# Patient Record
Sex: FEMALE | Race: WHITE | NOT HISPANIC OR LATINO | Employment: OTHER | URBAN - NONMETROPOLITAN AREA
[De-identification: names, ages, dates, MRNs, and addresses within clinical notes are randomized per-mention and may not be internally consistent; named-entity substitution may affect disease eponyms.]

---

## 2019-03-11 ENCOUNTER — HISTORICAL (OUTPATIENT)
Dept: ADMINISTRATIVE | Facility: HOSPITAL | Age: 84
End: 2019-03-11

## 2019-05-08 ENCOUNTER — HISTORICAL (OUTPATIENT)
Dept: ADMINISTRATIVE | Facility: HOSPITAL | Age: 84
End: 2019-05-08

## 2022-09-21 ENCOUNTER — HISTORICAL (OUTPATIENT)
Dept: ADMINISTRATIVE | Facility: HOSPITAL | Age: 87
End: 2022-09-21

## 2023-07-17 ENCOUNTER — HOSPITAL ENCOUNTER (OUTPATIENT)
Dept: RADIOLOGY | Facility: HOSPITAL | Age: 88
Discharge: HOME | End: 2023-07-17
Attending: NURSE PRACTITIONER
Payer: COMMERCIAL

## 2023-07-17 DIAGNOSIS — K59.09 CHRONIC CONSTIPATION: ICD-10-CM

## 2023-07-17 PROCEDURE — 74019 RADEX ABDOMEN 2 VIEWS: CPT | Mod: TC

## 2023-11-12 ENCOUNTER — HOSPITAL ENCOUNTER (INPATIENT)
Facility: HOSPITAL | Age: 88
LOS: 5 days | Discharge: NURSING, REHAB OR LONG-TERM CARE | DRG: 124 | End: 2023-11-17
Attending: EMERGENCY MEDICINE | Admitting: SURGERY
Payer: MEDICARE

## 2023-11-12 DIAGNOSIS — S06.6XAA TRAUMATIC SUBARACHNOID HEMORRHAGE WITH UNKNOWN LOSS OF CONSCIOUSNESS STATUS, INITIAL ENCOUNTER: ICD-10-CM

## 2023-11-12 DIAGNOSIS — S01.81XA FACIAL LACERATION, INITIAL ENCOUNTER: ICD-10-CM

## 2023-11-12 DIAGNOSIS — S06.5X0A TRAUMATIC SUBDURAL HEMATOMA WITHOUT LOSS OF CONSCIOUSNESS, INITIAL ENCOUNTER: ICD-10-CM

## 2023-11-12 DIAGNOSIS — S02.401A CLOSED FRACTURE OF MAXILLARY SINUS, INITIAL ENCOUNTER: ICD-10-CM

## 2023-11-12 DIAGNOSIS — R07.9 CHEST PAIN: ICD-10-CM

## 2023-11-12 DIAGNOSIS — S52.612A TRAUMATIC CLOSED FRACTURE OF ULNAR STYLOID WITH MINIMAL DISPLACEMENT, LEFT, INITIAL ENCOUNTER: ICD-10-CM

## 2023-11-12 DIAGNOSIS — R55 NEAR SYNCOPE: ICD-10-CM

## 2023-11-12 DIAGNOSIS — S02.32XA: ICD-10-CM

## 2023-11-12 DIAGNOSIS — S06.5XAA SUBDURAL HEMATOMA: Primary | ICD-10-CM

## 2023-11-12 DIAGNOSIS — S06.5X9A TRAUMATIC SUBDURAL HEMATOMA WITH LOSS OF CONSCIOUSNESS, INITIAL ENCOUNTER: ICD-10-CM

## 2023-11-12 PROBLEM — S02.40DD CLOSED FRACTURE OF LEFT SIDE OF MAXILLA WITH ROUTINE HEALING: Status: ACTIVE | Noted: 2023-11-12

## 2023-11-12 LAB
ABORH RETYPE: NORMAL
ALBUMIN SERPL-MCNC: 3.3 G/DL (ref 3.4–4.8)
ALBUMIN/GLOB SERPL: 1 RATIO (ref 1.1–2)
ALP SERPL-CCNC: 74 UNIT/L (ref 40–150)
ALT SERPL-CCNC: 15 UNIT/L (ref 0–55)
ANION GAP SERPL CALC-SCNC: 8 MEQ/L
APTT PPP: 25.6 SECONDS (ref 23.2–33.7)
AST SERPL-CCNC: 19 UNIT/L (ref 5–34)
BASOPHILS # BLD AUTO: 0.03 X10(3)/MCL
BASOPHILS NFR BLD AUTO: 0.2 %
BILIRUB SERPL-MCNC: 1.1 MG/DL
BUN SERPL-MCNC: 15.6 MG/DL (ref 9.8–20.1)
BUN SERPL-MCNC: 16 MG/DL (ref 9.8–20.1)
CALCIUM SERPL-MCNC: 9.2 MG/DL (ref 8.4–10.2)
CALCIUM SERPL-MCNC: 9.4 MG/DL (ref 8.4–10.2)
CHLORIDE SERPL-SCNC: 102 MMOL/L (ref 98–111)
CHLORIDE SERPL-SCNC: 104 MMOL/L (ref 98–111)
CO2 SERPL-SCNC: 26 MMOL/L (ref 23–31)
CO2 SERPL-SCNC: 29 MMOL/L (ref 23–31)
CREAT SERPL-MCNC: 0.79 MG/DL (ref 0.55–1.02)
CREAT SERPL-MCNC: 0.89 MG/DL (ref 0.55–1.02)
CREAT/UREA NIT SERPL: 18
EOSINOPHIL # BLD AUTO: 0 X10(3)/MCL (ref 0–0.9)
EOSINOPHIL NFR BLD AUTO: 0 %
ERYTHROCYTE [DISTWIDTH] IN BLOOD BY AUTOMATED COUNT: 14 % (ref 11.5–17)
ERYTHROCYTE [DISTWIDTH] IN BLOOD BY AUTOMATED COUNT: 14.2 % (ref 11.5–17)
GFR SERPLBLD CREATININE-BSD FMLA CKD-EPI: 60 MLS/MIN/1.73/M2
GFR SERPLBLD CREATININE-BSD FMLA CKD-EPI: >60 MLS/MIN/1.73/M2
GLOBULIN SER-MCNC: 3.2 GM/DL (ref 2.4–3.5)
GLUCOSE SERPL-MCNC: 101 MG/DL (ref 75–121)
GLUCOSE SERPL-MCNC: 128 MG/DL (ref 75–121)
GROUP & RH: NORMAL
HCT VFR BLD AUTO: 36.3 % (ref 37–47)
HCT VFR BLD AUTO: 36.8 % (ref 37–47)
HGB BLD-MCNC: 12.3 G/DL (ref 12–16)
HGB BLD-MCNC: 12.4 G/DL (ref 12–16)
IMM GRANULOCYTES # BLD AUTO: 0.04 X10(3)/MCL (ref 0–0.04)
IMM GRANULOCYTES NFR BLD AUTO: 0.3 %
INDIRECT COOMBS GEL: NORMAL
INR PPP: 1
LYMPHOCYTES # BLD AUTO: 1.48 X10(3)/MCL (ref 0.6–4.6)
LYMPHOCYTES NFR BLD AUTO: 10.5 %
MCH RBC QN AUTO: 31.7 PG (ref 27–31)
MCH RBC QN AUTO: 31.8 PG (ref 27–31)
MCHC RBC AUTO-ENTMCNC: 33.4 G/DL (ref 33–36)
MCHC RBC AUTO-ENTMCNC: 34.2 G/DL (ref 33–36)
MCV RBC AUTO: 92.8 FL (ref 80–94)
MCV RBC AUTO: 95.1 FL (ref 80–94)
MONOCYTES # BLD AUTO: 0.89 X10(3)/MCL (ref 0.1–1.3)
MONOCYTES NFR BLD AUTO: 6.3 %
NEUTROPHILS # BLD AUTO: 11.59 X10(3)/MCL (ref 2.1–9.2)
NEUTROPHILS NFR BLD AUTO: 82.7 %
NRBC BLD AUTO-RTO: 0 %
NRBC BLD AUTO-RTO: 0 %
PLATELET # BLD AUTO: 224 X10(3)/MCL (ref 130–400)
PLATELET # BLD AUTO: 246 X10(3)/MCL (ref 130–400)
PMV BLD AUTO: 11.3 FL (ref 7.4–10.4)
PMV BLD AUTO: 11.4 FL (ref 7.4–10.4)
POTASSIUM SERPL-SCNC: 3.8 MMOL/L (ref 3.5–5.1)
POTASSIUM SERPL-SCNC: 3.9 MMOL/L (ref 3.5–5.1)
PROT SERPL-MCNC: 6.5 GM/DL (ref 5.8–7.6)
PROTHROMBIN TIME: 13.4 SECONDS (ref 12.5–14.5)
RBC # BLD AUTO: 3.87 X10(6)/MCL (ref 4.2–5.4)
RBC # BLD AUTO: 3.91 X10(6)/MCL (ref 4.2–5.4)
SODIUM SERPL-SCNC: 137 MMOL/L (ref 132–146)
SODIUM SERPL-SCNC: 139 MMOL/L (ref 132–146)
SPECIMEN OUTDATE: NORMAL
WBC # SPEC AUTO: 12.55 X10(3)/MCL (ref 4.5–11.5)
WBC # SPEC AUTO: 14.03 X10(3)/MCL (ref 4.5–11.5)

## 2023-11-12 PROCEDURE — 99285 EMERGENCY DEPT VISIT HI MDM: CPT | Mod: 25

## 2023-11-12 PROCEDURE — 85730 THROMBOPLASTIN TIME PARTIAL: CPT | Performed by: EMERGENCY MEDICINE

## 2023-11-12 PROCEDURE — 11000001 HC ACUTE MED/SURG PRIVATE ROOM

## 2023-11-12 PROCEDURE — 80053 COMPREHEN METABOLIC PANEL: CPT | Performed by: EMERGENCY MEDICINE

## 2023-11-12 PROCEDURE — 25000003 PHARM REV CODE 250: Performed by: STUDENT IN AN ORGANIZED HEALTH CARE EDUCATION/TRAINING PROGRAM

## 2023-11-12 PROCEDURE — 51798 US URINE CAPACITY MEASURE: CPT

## 2023-11-12 PROCEDURE — 85025 COMPLETE CBC W/AUTO DIFF WBC: CPT | Performed by: EMERGENCY MEDICINE

## 2023-11-12 PROCEDURE — 99223 1ST HOSP IP/OBS HIGH 75: CPT | Mod: ,,, | Performed by: NEUROLOGICAL SURGERY

## 2023-11-12 PROCEDURE — 99223 PR INITIAL HOSPITAL CARE,LEVL III: ICD-10-PCS | Mod: ,,, | Performed by: NEUROLOGICAL SURGERY

## 2023-11-12 PROCEDURE — 99223 1ST HOSP IP/OBS HIGH 75: CPT | Mod: ,,, | Performed by: SURGERY

## 2023-11-12 PROCEDURE — 85027 COMPLETE CBC AUTOMATED: CPT | Performed by: STUDENT IN AN ORGANIZED HEALTH CARE EDUCATION/TRAINING PROGRAM

## 2023-11-12 PROCEDURE — 99223 PR INITIAL HOSPITAL CARE,LEVL III: ICD-10-PCS | Mod: ,,, | Performed by: SURGERY

## 2023-11-12 PROCEDURE — 86901 BLOOD TYPING SEROLOGIC RH(D): CPT | Performed by: EMERGENCY MEDICINE

## 2023-11-12 PROCEDURE — 85610 PROTHROMBIN TIME: CPT | Performed by: EMERGENCY MEDICINE

## 2023-11-12 RX ORDER — LEVETIRACETAM 500 MG/1
500 TABLET ORAL 2 TIMES DAILY
Status: DISCONTINUED | OUTPATIENT
Start: 2023-11-12 | End: 2023-11-14

## 2023-11-12 RX ORDER — ONDANSETRON 2 MG/ML
4 INJECTION INTRAMUSCULAR; INTRAVENOUS EVERY 6 HOURS PRN
Status: DISCONTINUED | OUTPATIENT
Start: 2023-11-12 | End: 2023-11-17 | Stop reason: HOSPADM

## 2023-11-12 RX ORDER — SODIUM CHLORIDE 0.9 % (FLUSH) 0.9 %
10 SYRINGE (ML) INJECTION
Status: DISCONTINUED | OUTPATIENT
Start: 2023-11-12 | End: 2023-11-17 | Stop reason: HOSPADM

## 2023-11-12 RX ORDER — OXYCODONE HYDROCHLORIDE 5 MG/1
5 TABLET ORAL EVERY 6 HOURS PRN
Status: DISCONTINUED | OUTPATIENT
Start: 2023-11-12 | End: 2023-11-17 | Stop reason: HOSPADM

## 2023-11-12 RX ORDER — MORPHINE SULFATE 4 MG/ML
2 INJECTION, SOLUTION INTRAMUSCULAR; INTRAVENOUS EVERY 4 HOURS PRN
Status: DISCONTINUED | OUTPATIENT
Start: 2023-11-12 | End: 2023-11-14

## 2023-11-12 RX ORDER — SODIUM CHLORIDE 9 MG/ML
INJECTION, SOLUTION INTRAVENOUS CONTINUOUS
Status: DISCONTINUED | OUTPATIENT
Start: 2023-11-12 | End: 2023-11-13

## 2023-11-12 RX ADMIN — LEVETIRACETAM 500 MG: 500 TABLET, FILM COATED ORAL at 08:11

## 2023-11-12 RX ADMIN — SODIUM CHLORIDE: 9 INJECTION, SOLUTION INTRAVENOUS at 08:11

## 2023-11-12 RX ADMIN — LEVETIRACETAM 500 MG: 500 TABLET, FILM COATED ORAL at 09:11

## 2023-11-12 RX ADMIN — SODIUM CHLORIDE: 9 INJECTION, SOLUTION INTRAVENOUS at 01:11

## 2023-11-12 RX ADMIN — SODIUM CHLORIDE: 9 INJECTION, SOLUTION INTRAVENOUS at 03:11

## 2023-11-12 NOTE — PLAN OF CARE
Problem: Adult Inpatient Plan of Care  Goal: Patient-Specific Goal (Individualized)  Outcome: Ongoing, Progressing     Problem: Impaired Wound Healing  Goal: Optimal Wound Healing  Outcome: Ongoing, Progressing     Problem: Fall Injury Risk  Goal: Absence of Fall and Fall-Related Injury  Outcome: Ongoing, Progressing     Problem: Skin Injury Risk Increased  Goal: Skin Health and Integrity  Outcome: Ongoing, Progressing

## 2023-11-12 NOTE — CONSULTS
[facility]  Hospital Consultation      Reason for Consultation: s/p 2 falls yesterday on 11/11/2023 with intermittent confusion.  A CT head without contrast demonstrates a focal left acute subdural hematoma in the posterior parietal region measuring 6 mm with no significant midline shift    Consulted by: Dr. Ban Chase, REBECA Lucio    Date of Consultation: 11/12/2023  Date of Admission: 11/12/2023     SUBJECTIVE:     Chief Complaint: s/p 2 falls yesterday on 11/11/2023 with intermittent confusion.    History of Present Illness:   Ms. Lejeune is a 89 y.o. functionally independent female who has no known medical conditions and is not on any blood thinner medications.  Yesterday on 11/11/2023, the patient had 2 falls with altered mental status.  She was initially evaluated in the ER at [facility].  A CT head without contrast demonstrated a focal left acute subdural hematoma in the posterior parietal region measuring 6 mm with no significant midline shift.  A CT face without contrast demonstrated multiple left-sided facial fractures.  Ms. Lejeune was transferred to the ER at [facility] for higher level of care.  She had a GCS 13 upon arrival.    I evaluated the patient in the ICU this morning.  She not have any headaches, visual changes, nausea, vomiting, seizures, weakness, or numbness.  Ms. Lejeune has left periorbital ecchymosis and edema as well as mild neck pain.  At baseline, the patient lives by herself and ambulates with a cane on an as-needed basis.    Admission lab work demonstrates platelets, PT, INR, and PTT values to be within acceptable ranges.  Keppra was initiated in the ER for seizure prophylaxis.      Patient Active Problem List    Diagnosis Date Noted    Subdural hematoma 11/12/2023    Closed fracture of left orbital floor 11/12/2023    Closed fracture of left side of maxilla with routine healing 11/12/2023     History  reviewed. No pertinent past medical history.  History reviewed. No pertinent surgical history.    Current Facility-Administered Medications:     0.9%  NaCl infusion, , Intravenous, Continuous, Samuel Frank MD, Last Rate: 100 mL/hr at 11/12/23 0700, Rate Verify at 11/12/23 0700    levETIRAcetam tablet 500 mg, 500 mg, Oral, BID, Samuel Frank MD    morphine injection 2 mg, 2 mg, Intravenous, Q4H PRN, Samuel Frank MD    ondansetron injection 4 mg, 4 mg, Intravenous, Q6H PRN, Samuel Frank MD    oxyCODONE immediate release tablet 5 mg, 5 mg, Oral, Q6H PRN, Samuel Frank MD    sodium chloride 0.9% flush 10 mL, 10 mL, Intravenous, PRN, Samuel Frank MD    Review of patient's allergies indicates:   Allergen Reactions    Codeine        Social History     Tobacco Use    Smoking status: Not on file    Smokeless tobacco: Not on file   Substance Use Topics    Alcohol use: Not on file       History reviewed. No pertinent family history.    ROS:  Constitutional:  Negative for chills and fever.   HENT:  Negative for congestion and sore throat.    Eyes:  Negative for blurred vision and double vision.   Respiratory:  Negative for cough and shortness of breath.    Cardiovascular:  Negative for chest pain and palpitations.   Gastrointestinal:  Negative for constipation, diarrhea, nausea and vomiting.   Musculoskeletal:  Positive for neck pain, Negative for back pain.  Neurological:  Negative for sensory change, focal weakness and headaches.   Endo/Heme/Allergies:  Does not bruise/bleed easily.   Psychiatric/Behavioral:  Negative for depression. The patient is not nervous/anxious.        OBJECTIVE:     Vital Signs (Most Recent)  Temp: 98.8 °F (37.1 °C) (11/12/23 0800)  Pulse: 72 (11/12/23 0900)  Resp: 15 (11/12/23 0900)  BP: (!) 147/69 (11/12/23 0900)  SpO2: 96 % (11/12/23 0900)  Body mass index is 21.26 kg/m².      Constitutional:   Frail appearing,  "cooperative    HEENT:   Significant L periorbital edema and ecchymosis    Body habitus:  Thin    Mental Status:   GCS- 15  E-4, V-5, M-6    Opens eyes spontaneously  Oriented x 3  Normal speech  Follows commands in all extremities    Cranial nerves:  CN II: PERRL, 4 to 3 mm, brisk bilaterally  CN III, IV, and VI: extraocular movements normal  CN V: normal facial sensation and masseter function  CN VII: facial strength normal and symmetrical  CN VIII: hearing normal bilaterally  CN IX and X: swallowing and phonation normal  CN XI: shoulder shrug intact bilaterally  CN XII: tongue protrusion midline    Motor:  Muscle bulk: decreased in all extremities  Tone: normal in all extremities  No pronator drift    Upper extremities:  Deltoid: right 5/5; left 5/5  Biceps: right 5/5; left 5/5  Triceps: right 5/5; left 5/5  : right 5/5; left 5/5  Interosseous muscles: right 5/5; left 5/5    Lower extremities:  Hip flexors: right 5/5; left 5/5  Knee extensors: right 5/5; left 5/5  Knee flexors: right 5/5; left 5/5  Foot dorsiflexors: right 5/5; left 5/5  Foot plantar flexors: right 5/5; left 5/5    Sensation:  Normal to light touch x 4 extremities    Reflexes:  Biceps: right 1+/4; left 1+/4  Brachioradialis: right 1+/4; left 1+/4  Triceps: right 1+/4; left 1+/4  Knee: right 1+/4; left 1+/4  Ankle: right 1+/4; left 1+/4    Novaks sign: right negative; left negative  Babinski: right upgoing; left upgoing  Clonus: right negative; left negative    Coordination:  Finger to nose: right normal; left normal    Musculoskeletal:  Cervical: No pain with palpation, Full ROM  Upper back: No pain with palpation  Lower back: No pain with palpation      Data Review:    Laboratory Review:  Blood Gases:  No results found for: "PHART", "RQM1EQV", "PO2ART", "FEJ5TZK", "U8SQTIFF", "BEART"    CBC without Differential:  Lab Results   Component Value Date    WBC 12.55 (H) 11/12/2023    HGB 12.3 11/12/2023    HCT 36.8 (L) 11/12/2023     " "11/12/2023    MCV 95.1 (H) 11/12/2023     Differential:   No results found for: "NEUTROABS", "LYMPHSABS", "BASOSABS", "MONOSABS"    Basic Metabolic Panel:  BMP  Lab Results   Component Value Date     11/12/2023    K 3.9 11/12/2023    CO2 29 11/12/2023    BUN 16.0 11/12/2023    CREATININE 0.89 11/12/2023    CALCIUM 9.2 11/12/2023    EGFRNORACEVR 60 11/12/2023     Coagulation Studies:  Lab Results   Component Value Date    INR 1.0 11/12/2023    PROTIME 13.4 11/12/2023     Lab Results   Component Value Date    PTT 25.6 11/12/2023     Urinalysis:  No results found for: "SPECGRAV", "PHURINE", "LEUKOCYTESUR", "URINEPROTEIN", "PROTEINUR", "BILIRUBINUR", "UROBILINOGEN"       Radiology:  I have personally reviewed and evaluated the following reports as well as radiographic studies:    CT head without contrast, 11/12/2023- when compared to a CT head without contrast on 11/11/2023, there have been no significant changes.  There is a stable appearing focal  left acute subdural hematoma in the posterior parietal region measuring 6 mm with no significant midline shift.     CT face without contrast, 11/11/2023-  fractures involving the posterior floor of the left orbit, left zygomatic arch, and left maxillary sinus.    CT cervical spine without contrast, 11/11/2023- minimal degenerative retrolisthesis of C4 on C5 with multilevel degenerative disc disease.  There are no fractures.        ASSESSMENT/PLAN:     Ms. Lejeune is a 89 y.o. functionally independent female who has no known medical conditions and is not on any blood thinner medications.  The patient is PID #1 s/p 2 falls with altered mental status.  She currently has a GCS 15 with a normal motor and sensory neurological exam.    A CT head without contrast completed on 11/12/2023 was reviewed.  When compared to a CT head without contrast on 11/11/2023, there have been no significant changes.  There is a stable appearing focal left acute subdural hematoma in the " posterior parietal region measuring 6 mm with no significant midline shift.       1.  The patient has been admitted to the ICU on the trauma surgery service.  Neuro checks are being reduced to Q2 hr from Q1 hr.      2.  There are no plans for acute neurosurgical intervention at this time, as the patient has a GCS 15 with stable CT head findings.  Furthermore, Ms. Lejeune would not be anticipated to do well with any type of major neurosurgical procedure at her advanced age.    3.  Keppra was administered in the ER.  This will be continued for seizure prophylaxis with a dose of 500 mg PO BID for a duration of 7 days post bleed.     4.  Systolic blood pressure goal is 100-150.  A Cleviprex or Cardene gtt can be initiated as needed for hypertension.     5.  The patient is encouraged to mobilize with physical therapy and occupational therapy.      6.  Plastic surgeon Dr. Alexander has been consulted to manage her multiple left sided facial fractures.      7.  As long as Ms. Lejeune remains neurologically stable, she will be able to be transferred to a floor bed from the ICU tomorrow with Q4 hr neuro checks on Monday, 11/13/2023    8.  In addition, the patient will be able to be started on subcutaneous Lovenox for DVT prophylaxis tomorrow.      9.  Case management/ social work has been consulted for discharge planning/ SNF placement.     10.  Ms. Lejeune will continue to be followed by Neurosurgery as an inpatient on an as-needed basis for any concerning changes in condition or symptoms.  Please do not hesitate to contact Neurosurgery for any additional questions.      Thank you for referring this very pleasant patient to the neurosurgery service.         Pooja Mendoza MD  Neurosurgery

## 2023-11-12 NOTE — ED NOTES
BIB EMS from OSH for mechanical fall out of bed. Bruising and swelling noted to L side of pt's face. Head wrapped PTA with dried blood noted underneath. GCS 14 - eyes

## 2023-11-12 NOTE — ED PROVIDER NOTES
Encounter Date: 11/11/2023       History     Chief Complaint   Patient presents with    Fall     Pt arrives via [facility], EMS transfer from [facility] , for neuro services . Pt  responds to voice.      89-year-old female presents to the emergency department as transfer from [facility] for neurosurgical/facial trauma evaluation after unwitnessed fall at home sometime between the hours of 11:00 a.m. and 1:00 p.m. yesterday.      Workup at the previous facility notable for:   1.  Extra-axial hemorrhage along the left posterior parietal lobe region measuring at least 2.4 x 0.6 x 2.6 cm.  No midline shift is seen  2. Comminuted fracture of the left anterior, posterior and medial maxillary sinus wall.  Three.  Fracture of the medial maxillary sinus wall into the nasal cavity   4.  Fracture of the posterior floor of the orbit.  No entrapment of the muscles.      Chest x-ray with displaced rib fracture, no focal infiltrate or effusion.    CT of the cervical spine with no acute fracture of the cervical spine.  Moderate to severe cervical spondylosis described.    Left facial wound closed with Dermabond per documentation    The history is provided by the EMS personnel, medical records and a relative.     Review of patient's allergies indicates:   Allergen Reactions    Codeine      History reviewed. No pertinent past medical history.  History reviewed. No pertinent surgical history.  History reviewed. No pertinent family history.     Review of Systems   Unable to perform ROS: Mental status change       Physical Exam     Initial Vitals [11/12/23 0027]   BP Pulse Resp Temp SpO2   (!) 141/70 89 16 99.3 °F (37.4 °C) 96 %      MAP       --         Physical Exam    Nursing note and vitals reviewed.  Constitutional: She appears well-developed and well-nourished. No distress.   HENT:   Left facial/forehead contusions, superficial abrasions and lacerations, Dermabond in place   Eyes: EOM are normal. Pupils  are equal, round, and reactive to light. Right eye exhibits no discharge. Left eye exhibits no discharge.   Neck: Neck supple.   No midline or paraspinal tenderness to palpation, no step-off deformity, full, active range of motion without pain or hesitation   Normal range of motion.  Cardiovascular:  Normal rate, regular rhythm and intact distal pulses.           Pulmonary/Chest: No respiratory distress. She exhibits no tenderness.   Abdominal: Abdomen is soft. She exhibits no distension. There is no abdominal tenderness.   Musculoskeletal:      Cervical back: Normal range of motion and neck supple.      Comments: No thoracolumbar tenderness elevation, no step-off deformity, no gross long bone deformity of any extremity     Neurological:   Somnolent, arouses to voice, follows commands.  Moves all extremities symmetrically, global weakness appreciated   Skin: Skin is warm and dry.         ED Course   Critical Care    Date/Time: 11/12/2023 12:27 AM    Performed by: Ban Chase MD  Authorized by: Ban Chase MD  Direct patient critical care time: 19 minutes  Additional history critical care time: 6 minutes  Ordering / reviewing critical care time: 6 minutes  Documentation critical care time: 4 minutes  Consulting other physicians critical care time: 8 minutes  Consult with family critical care time: 5 minutes  Total critical care time (exclusive of procedural time) : 48 minutes  Critical care time was exclusive of separately billable procedures and treating other patients.  Critical care was necessary to treat or prevent imminent or life-threatening deterioration of the following conditions: CNS failure or compromise and trauma.  Critical care was time spent personally by me on the following activities: development of treatment plan with patient or surrogate, discussions with consultants, interpretation of cardiac output measurements, evaluation of patient's response to treatment, examination of patient,  obtaining history from patient or surrogate, ordering and performing treatments and interventions, ordering and review of laboratory studies, ordering and review of radiographic studies, pulse oximetry, re-evaluation of patient's condition and review of old charts.        Labs Reviewed   COMPREHENSIVE METABOLIC PANEL - Abnormal; Notable for the following components:       Result Value    Glucose Level 128 (*)     Albumin Level 3.3 (*)     Albumin/Globulin Ratio 1.0 (*)     All other components within normal limits   CBC WITH DIFFERENTIAL - Abnormal; Notable for the following components:    WBC 14.03 (*)     RBC 3.91 (*)     Hct 36.3 (*)     MCH 31.7 (*)     MPV 11.4 (*)     Neut # 11.59 (*)     All other components within normal limits   PROTIME-INR - Normal   APTT - Normal   CBC W/ AUTO DIFFERENTIAL    Narrative:     The following orders were created for panel order CBC auto differential.  Procedure                               Abnormality         Status                     ---------                               -----------         ------                     CBC with Differential[[id number]]       Abnormal            Final result                 Please view results for these tests on the individual orders.   CBC WITHOUT DIFFERENTIAL   BASIC METABOLIC PANEL   TYPE & SCREEN   ABORH RETYPE          Imaging Results              CT Head Without Contrast (Final result)  Result time 11/12/23 08:41:55      Final result by Chino Santana MD (11/12/23 08:41:55)                   Impression:      Findings seen consistent with subdural hemorrhage in the left parietal and occipital region as well as subarachnoid hemorrhage in the left parietal region at the centrum semiovale and small amount of blood the along the  inferior aspect of the left sylvian fissure.  Findings are unchanged since the prior examination.      Electronically signed by: Chino Santana  Date:    11/12/2023  Time:    08:41                Narrative:    EXAMINATION:  CT HEAD WITHOUT CONTRAST    CLINICAL HISTORY:  Subdural hemorrhage;    TECHNIQUE:  Multiple axial images were obtained from the base of the brain to the vertex without contrast administration.  Sagittal and coronal reconstructions were performed. .Automatic exposure control  (AEC) is utilized to reduce patient radiation exposure.    COMPARISON:  11/11/2023 from an outside institution    FINDINGS:  There is some cerebral atrophy seen consistent with patient's age. There is some compensatory ventricular dilatation and periventricular white matter change consistent with patient's age.  There is an area of calcification seen in the left parietal region near the cerebral convexity.    There is a subdural hematoma seen in the left posterior parietal region extending into the occipital region.  Maximum dimension of the subdural hematoma is 7.2 mm.  This was seen on the prior examination as well and appears unchanged.  There is some subarachnoid hemorrhage seen in the left parietal region near the centrum semiovale.  This appears similar to the prior examination. There is small amount of blood seen in along the inferior aspect the sylvian fissure on the left side.  This appears unchanged.  No new areas of hemorrhage are seen.  The calvarium is intact.    There is evidence of pansinusitis                                       Medications   sodium chloride 0.9% flush 10 mL (has no administration in time range)   0.9%  NaCl infusion (has no administration in time range)   oxyCODONE immediate release tablet 5 mg (has no administration in time range)   morphine injection 2 mg (has no administration in time range)   levETIRAcetam tablet 500 mg (has no administration in time range)   ondansetron injection 4 mg (has no administration in time range)     Medical Decision Making  Problems Addressed:  Closed fracture of left orbital floor: acute illness or injury    Amount and/or Complexity of Data  Reviewed  Labs: ordered.  Radiology: ordered.    Risk  Decision regarding hospitalization.        ED assessment:    Ms. Lejeune presented as transfer for higher level of care, Neurosurgery and facial trauma/ENT/plastic surgery evaluation with multiple facial fractures sustained an unwitnessed fall, subdural hemorrhage described on CT of the outside hospital.  Presently somnolent but arouses to voice, follows commands.  Daughter reports declining mentation throughout transport; however, it is now also middle of the night, unclear if this is simply due to timing or reflective of worsening head injury.  Last CT of the brain about 5-6 hours prior to arrival here, will plan for urgent repeat to ensure no expanding hematoma.  Antibiotics administered previous facility, hemo sinus described.  Unasyn ordered.    Differential diagnosis (including but not limited to):   Closed head injury, intracranial hemorrhage, cerebral contusion, concussion, facial fracture, hemo sinus, pr will contusion, acute kidney injury, electrolyte derangements, urinary tract infection    ED management:   CT of the head with no significant interval change.  Case discussed with neurosurgery who will follow in consultation.  Recommended ICU admit for close neuro monitoring.  Facial fractures discussed with plastic surgeon on-call.  He will follow in consultation.  Case discussed with Trauma surgery who will evaluate bedside for admission.    I did have a conversation at the bedside with the patient's daughter regarding code status given her advanced age and traumatic brain injury.  Desires full code advance directive at this time.  Communicated to Trauma Service.    My independent radiology interpretation:   CT head without IV contrast: Left posterior parietal subdural hematoma, no midline shift       Amount and/or Complexity of Data Reviewed  Independent historian: EMS and daughter    Summary of history:  HPI as documented above provided by EMS and  daughter.  Reports unwitnessed fall, happened sometime suspected between 11:00 a.m. and 1:00 p.m. yesterday.  States was more alert, more somnolent as the evening has worn on.  Baseline very talkative, less so today.  External data reviewed: transfer records, prior labs, and prior imaging  Summary of data reviewed:     CT head without IV contrast 7:30 p.m.:  Ventricles are normal limits for size.  Both globes appear symmetric.  The mastoid air cells appear clear.  There is minimal mucoperiosteal thickening of the ethmoid sinuses.  There is partial opacification of the left maxillary, sphenoid, and bilateral ethmoid sinuses.  A coarse calcification is seen at the left periventricular white matter.  The calvarium is intact.  The gray-white matter differentiation is within normal limits.  There is acute extra-axial hemorrhage along the left posterior parietal lobe region measuring at least 2.4 x 0.6 x 2.6 cm.  No midline shift is seen.     CT facial without IV contrast:  TMJ 0.0 appropriately positioned.  Mandible is intact.  Implants are seen in both the right and left side of the mandible and on the right anterior implant there is a lucency seen adjacent to the bone mandibular interface.  This may represent loosening.  The alveolar ridge of the maxilla is intact.  Pterygoid plates are unremarkable.  There is focal angulation of the left zygomatic arch suggesting infection fracture.  Comminuted fracture of the left anterior, posterior and medial maxillary sinus wall is seen.  Nasal septum and nasal bones are intact.  There is fracture of the posterior floor of the orbit.  No entrapment of the muscles is seen.  There is fracture with a medial maxillary sinus wall into the nasal cavity.  Orbits and paranasal sinuses: Globe is intact.  No intraconal or extraconal abnormality.  Complete opacification of the left maxillary sinus with hyperdense material consistent blood.  There is opacification of the left frontal sinuses  as well as a fluid air cells.  Air-fluid levels noted in the sphenoid sinus.  Mastoid air cells are aerated.  Soft tissues:  Soft tissue swelling is over the left orbit and there is air in the subcutaneous tissues of the left temporal region suggesting laceration.    CT cervical spine without IV contrast:  No acute fracture of the cervical spine.  Minimal retrolisthesis at C4-C5 is probably due to degenerative disc disease and facet arthropathy to severe cervical spondylosis.  Fusion of the bilateral C2-C3 and right C3-C4 facet joints and right C5-C6 facet joints.  Severe disc height loss at C4-5 with severe right facet arthropathy.  Disc osteophyte complexes of C3-C7 most prominent at C4-C5 with moderate central canal stenosis.  Severe foraminal stenosis at right C3-4, bilateral C4-5, bilateral C5-6, bilateral C6-7.  Soft tissues unremarkable.    Chest x-ray:  No acute airspace opacities, pleural effusion or pneumothorax.  Heart/mediastinum unremarkable.  Old fracture deformity of the proximal right humerus.  Soft tissues unremarkable.    EKG with normal sinus rhythm Priyanka beats per minute, occasional PAC.  QT prolongation, no STEMI.  WBC 16.2, hemoglobin 12.4, hematocrit 38.0, platelets 235   Sodium 140, potassium 4.1, chloride 104, CO2 26, BUN 17, creatinine 0.83, glucose 130    Risk and benefits of testing: discussed   Labs: ordered and reviewed  Radiology: ordered and independent interpretation performed (see above or ED course)    Discussion of management or test interpretation with external provider(s): discussed with Trauma surgery, Neurosurgery, facial trauma surgery consultant   Summary of discussion:  As below    Risk  Decision regarding hospitalization  Shared decision making     Critical Care  30-74 minutes     Ban LOVE MD personally performed the history, PE, MDM, and procedures as documented above and agree with the scribe's documentation. \             ED Course as of 11/12/23 0257   Sun Nov  12, 2023   0046 This patient has had declining mentation throughout the evening, more somnolent now.  Though may be appropriate for time of day, it has been over 5 hours since the initial CT scan.  Will repeat now to ensure no expanding hematoma. [KS]   0055 Discussed with facial trauma on-call, Dr. Alexander.  Consultation placed  Discussed with Neurosurgery on-call, Dr. Mendoza. Consultation placed.  [KS]   0149 Presently having trouble with PACS communication to [facility], unable to get images to radiologist.  [KS]   0204 Discussed with Trauma resident. [KS]      ED Course User Index  [KS] Ban Chase MD                      Clinical Impression:   Final diagnoses:  [S02.32XA] Closed fracture of left orbital floor  [S06.5X9A] Traumatic subdural hematoma with loss of consciousness, initial encounter  [S06.6XAA] Traumatic subarachnoid hemorrhage with unknown loss of consciousness status, initial encounter  [S02.401A] Closed fracture of maxillary sinus, initial encounter  [S01.81XA] Facial laceration, initial encounter        ED Disposition Condition    Admit Stable                Ban Chase MD  11/12/23 8076

## 2023-11-12 NOTE — CONSULTS
[facility] - [facility] [address] [facility]  Plastic Surgery  Consult Note    Patient Name: Georgeanna P Lejeune  MRN: [id number]  Code Status: Full Code  Admission Date: 11/12/2023  Hospital Length of Stay: 0 days  Attending Physician: Rajiv Frederick MD  Primary Care Provider: Suzette Kuo NP    Consults  Subjective:     Chief Complaint/Reason for Admission: fall    History of Present Illness: 89-year-old female with no apparent past medical history reportedly takes no medications who fell at home twice earlier today.  She does not take any blood thinners.  CT scans revealed comminuted fracture of the left anterior, posterior, and medial maxillary sinus wall, posterior floor of the orbit, medial maxillary sinus wall and a subtle left frontal subdural hematoma.     I have been asked to evaluate from a facial trauma standpoint.  She denies any visual issues.    No current facility-administered medications on file prior to encounter.     No current outpatient medications on file prior to encounter.       Review of patient's allergies indicates:   Allergen Reactions    Codeine        History reviewed. No pertinent past medical history.  History reviewed. No pertinent surgical history.  Family History    None       Tobacco Use    Smoking status: Not on file    Smokeless tobacco: Not on file   Substance and Sexual Activity    Alcohol use: Not on file    Drug use: Not on file    Sexual activity: Not on file     Review of Systems   All other systems reviewed and are negative.    Objective:     Vital Signs (Most Recent):  Temp: 98.8 °F (37.1 °C) (11/12/23 0800)  Pulse: 72 (11/12/23 0900)  Resp: 15 (11/12/23 0900)  BP: (!) 147/69 (11/12/23 0900)  SpO2: 96 % (11/12/23 0900) Vital Signs (24h Range):  Temp:  [98.8 °F (37.1 °C)-99.3 °F (37.4 °C)] 98.8 °F (37.1 °C)  Pulse:  [68-98] 72  Resp:  [13-25] 15  SpO2:  [94 %-98 %] 96 %  BP: (113-155)/(60-79) 147/69     Weight: 54.4 kg (120 lb)  Body mass index is 21.26  kg/m².      Intake/Output Summary (Last 24 hours) at 11/12/2023 0940  Last data filed at 11/12/2023 0700  Gross per 24 hour   Intake 355.44 ml   Output 0 ml   Net 355.44 ml       Physical Exam  Vitals reviewed.   HENT:      Head: Normocephalic.      Right Ear: External ear normal.      Left Ear: External ear normal.      Nose: Nose normal.      Mouth/Throat:      Mouth: Mucous membranes are moist.   Eyes:      Extraocular Movements: Extraocular movements intact.      Comments: Left sided bruising and swelling, vision intact   Neck:      Comments: Collar  Cardiovascular:      Rate and Rhythm: Normal rate.   Pulmonary:      Effort: Pulmonary effort is normal.   Abdominal:      General: Abdomen is flat.   Musculoskeletal:         General: Normal range of motion.   Skin:     General: Skin is warm.      Capillary Refill: Capillary refill takes less than 2 seconds.   Neurological:      General: No focal deficit present.      Mental Status: She is alert.   Psychiatric:         Mood and Affect: Mood normal.         Significant Labs:  All pertinent labs from the last 24 hours have been reviewed.    Significant Diagnostics:  CT: I have reviewed all pertinent results/findings within the past 24 hours. Left orbital floor and max sinus fracture    Assessment/Plan:    Left orbital floor and max sinus fractures are non operative.  Suggest optho consult to evaluate globe.  Please contact me with questions.     Active Diagnoses:    Diagnosis Date Noted POA    PRINCIPAL PROBLEM:  Closed fracture of left orbital floor [S02.32XA] 11/12/2023 Yes    Subdural hematoma [S06.5XAA] 11/12/2023 Yes    Closed fracture of left side of maxilla with routine healing [S02.40DD] 11/12/2023 Not Applicable      Problems Resolved During this Admission:       Thank you for your consult. I will sign off. Please contact us if you have any additional questions.    Cesar Alexander MD  Plastic Surgery  [facility] - [facility] [address] [facility]

## 2023-11-12 NOTE — H&P
Trauma Surgery   History and Physical Note    Patient Name: Georgeanna P Lejeune  YOB: 1928  Date: 11/12/2023 2:29 AM  Date of Admission: 11/12/2023  HD#0  POD#* No surgery found *    PRESENTING HISTORY   Chief Complaint/Reason for Admission: Fall from standing    History of Present Illness:  89-year-old female with no apparent past medical history reportedly takes no medications who fell at home twice earlier today.  She does not take any blood thinners.  CT scans revealed comminuted fracture of the left anterior, posterior, and medial maxillary sinus wall, posterior floor of the orbit, medial maxillary sinus wall and a subtle left frontal subdural hematoma.     Review of Systems:  12 point ROS negative except as stated in HPI    PAST HISTORY:   Past medical history:  History reviewed. No pertinent past medical history.    Past surgical history:  History reviewed. No pertinent surgical history.    Family history:  History reviewed. No pertinent family history.    Social history:  Social History     Socioeconomic History    Marital status:      Social History     Tobacco Use   Smoking Status Not on file   Smokeless Tobacco Not on file      Social History     Substance and Sexual Activity   Alcohol Use None        MEDICATIONS & ALLERGIES:   Allergies:   Review of patient's allergies indicates:   Allergen Reactions    Codeine      Home Meds: No current outpatient medications   No current facility-administered medications on file prior to encounter.     No current outpatient medications on file prior to encounter.      No current facility-administered medications on file prior to encounter.     No current outpatient medications on file prior to encounter.     Scheduled Meds:  Continuous Infusions:  PRN Meds:    OBJECTIVE:   Vital Signs:  VITAL SIGNS: 24 HR MIN & MAX LAST   Temp  Min: 99.3 °F (37.4 °C)  Max: 99.3 °F (37.4 °C)  99.3 °F (37.4 °C)   BP  Min: 135/64  Max: 141/70  135/64    Pulse   "Min: 89  Max: 89  89    Resp  Min: 16  Max: 25  (!) 25    SpO2  Min: 95 %  Max: 96 %  95 %      HT: 5' 3" (160 cm)  WT: 54.4 kg (120 lb)  BMI: 21.3   Intake/output: No intake/output data recorded.     Lines/drains/airway:       Physical Exam:  General:  Well developed, well nourished, no acute distress  HEENT:  Periorbital Ecchymosis to left eye.  Swelling to left cheek.  No orbital entrapment bilaterally.  Mild conjunctivitis the left eye.  CV:  RR, 2+ DPs bilaterally  Resp/chest: NWOB  GI:  Abdomen soft, non-tender, non-distended  :  Deferred  MSK:  No muscle atrophy, cyanosis, peripheral edema, moving all extremities spontaneously  Neuro: GCS 14. CNII-XII grossly intact, alert and oriented to person, place, and time. Strength and motor function grossly intact to all extremities, sensation intact to all extremities.    Labs:  Troponin:  No results for input(s): "TROPONINI" in the last 72 hours.  CBC:  Recent Labs     11/12/23  0046   WBC 14.03*   RBC 3.91*   HGB 12.4   HCT 36.3*      MCV 92.8   MCH 31.7*   MCHC 34.2     CMP:  Recent Labs     11/12/23  0046   CALCIUM 9.4   ALBUMIN 3.3*      K 3.8   CO2 26   BUN 15.6   CREATININE 0.79   ALKPHOS 74   ALT 15   AST 19   BILITOT 1.1     Lactic Acid:  No results for input(s): "LACTATE" in the last 72 hours.  ETOH:  No results for input(s): "ETHANOL" in the last 72 hours.   Urine Drug Screen:  No results for input(s): "COCAINE", "OPIATE", "BARBITURATE", "AMPHETAMINE", "FENTANYL", "CANNABINOIDS", "MDMA" in the last 72 hours.    Invalid input(s): "BENZODIAZEPINE", "PHENCYCLIDINE"   ABG  No results for input(s): "PH", "PO2", "PCO2", "HCO3", "BE" in the last 168 hours.      Diagnostic Results:  Xray Previous   Final Result      CT Previous   Final Result      CT Previous   Final Result      CT Previous   Final Result      CT Head Without Contrast    (Results Pending)       ASSESSMENT & PLAN:    89-year-old female with no apparent past medical history reportedly " takes no medications who fell at home twice earlier today.  She does not take any blood thinners.  CT scans revealed comminuted fracture of the left anterior, posterior, and medial maxillary sinus wall, posterior floor of the orbit, medial maxillary sinus wall and a subtle left frontal subdural hematoma.     Admit to the trauma ICU for q.1h neuro checks  Discussed case with Neurosurgery, she is already had 2 CT scans we will defer to them if they want to order another 1.    Consult facial trauma given maxillary sinus fx and orbital floor fx  IV normal saline, NPO until evaluated by Neurosurgery   P.o. Keppra for seizure prophylaxis   Goal -160  P.o. and IV pain meds   SCDs for mechanical DVT prophylaxis     Samuel Frank MD  General Surgery [facility]

## 2023-11-13 LAB
ANION GAP SERPL CALC-SCNC: 5 MEQ/L
AV INDEX (PROSTH): 0.61
AV MEAN GRADIENT: 4 MMHG
AV PEAK GRADIENT: 8 MMHG
AV VELOCITY RATIO: 0.61
BSA FOR ECHO PROCEDURE: 1.56 M2
BUN SERPL-MCNC: 13 MG/DL (ref 9.8–20.1)
CALCIUM SERPL-MCNC: 8.7 MG/DL (ref 8.4–10.2)
CHLORIDE SERPL-SCNC: 106 MMOL/L (ref 98–111)
CO2 SERPL-SCNC: 29 MMOL/L (ref 23–31)
CREAT SERPL-MCNC: 0.74 MG/DL (ref 0.55–1.02)
CREAT/UREA NIT SERPL: 18
CV ECHO LV RWT: 0.65 CM
DOP CALC AO PEAK VEL: 1.4 M/S
DOP CALC AO VTI: 25 CM
DOP CALC LVOT PEAK VEL: 0.85 M/S
DOP CALCLVOT PEAK VEL VTI: 15.2 CM
E WAVE DECELERATION TIME: 164 MSEC
E/A RATIO: 0.85
E/E' RATIO: 17 M/S
ECHO LV POSTERIOR WALL: 1.2 CM (ref 0.6–1.1)
ERYTHROCYTE [DISTWIDTH] IN BLOOD BY AUTOMATED COUNT: 14.2 % (ref 11.5–17)
FRACTIONAL SHORTENING: 51 % (ref 28–44)
GFR SERPLBLD CREATININE-BSD FMLA CKD-EPI: >60 MLS/MIN/1.73/M2
GLUCOSE SERPL-MCNC: 90 MG/DL (ref 75–121)
HCT VFR BLD AUTO: 36.8 % (ref 37–47)
HGB BLD-MCNC: 12 G/DL (ref 12–16)
INTERVENTRICULAR SEPTUM: 1.1 CM (ref 0.6–1.1)
LEFT ATRIUM SIZE: 3.3 CM
LEFT ATRIUM VOLUME INDEX MOD: 31.3 ML/M2
LEFT ATRIUM VOLUME MOD: 48.9 CM3
LEFT INTERNAL DIMENSION IN SYSTOLE: 1.8 CM (ref 2.1–4)
LEFT VENTRICLE DIASTOLIC VOLUME INDEX: 37.24 ML/M2
LEFT VENTRICLE DIASTOLIC VOLUME: 58.1 ML
LEFT VENTRICLE MASS INDEX: 89 G/M2
LEFT VENTRICLE SYSTOLIC VOLUME INDEX: 6.2 ML/M2
LEFT VENTRICLE SYSTOLIC VOLUME: 9.72 ML
LEFT VENTRICULAR INTERNAL DIMENSION IN DIASTOLE: 3.7 CM (ref 3.5–6)
LEFT VENTRICULAR MASS: 138.17 G
LV LATERAL E/E' RATIO: 17 M/S
LV SEPTAL E/E' RATIO: 17 M/S
LVOT MG: 2 MMHG
LVOT MV: 0.57 CM/S
MCH RBC QN AUTO: 31.3 PG (ref 27–31)
MCHC RBC AUTO-ENTMCNC: 32.6 G/DL (ref 33–36)
MCV RBC AUTO: 96.1 FL (ref 80–94)
MV PEAK A VEL: 1.2 M/S
MV PEAK E VEL: 1.02 M/S
NRBC BLD AUTO-RTO: 0 %
OHS LV EJECTION FRACTION SIMPSONS BIPLANE MOD: 69 %
PLATELET # BLD AUTO: 216 X10(3)/MCL (ref 130–400)
PMV BLD AUTO: 12 FL (ref 7.4–10.4)
POTASSIUM SERPL-SCNC: 3.5 MMOL/L (ref 3.5–5.1)
PV PEAK GRADIENT: 4 MMHG
PV PEAK VELOCITY: 1.06 M/S
RBC # BLD AUTO: 3.83 X10(6)/MCL (ref 4.2–5.4)
RIGHT VENTRICULAR END-DIASTOLIC DIMENSION: 3.7 CM
SODIUM SERPL-SCNC: 140 MMOL/L (ref 132–146)
TDI LATERAL: 0.06 M/S
TDI SEPTAL: 0.06 M/S
TDI: 0.06 M/S
TRICUSPID ANNULAR PLANE SYSTOLIC EXCURSION: 2.42 CM
TROPONIN I SERPL-MCNC: 0.03 NG/ML (ref 0–0.04)
WBC # SPEC AUTO: 10.12 X10(3)/MCL (ref 4.5–11.5)
Z-SCORE OF LEFT VENTRICULAR DIMENSION IN END DIASTOLE: -1.96
Z-SCORE OF LEFT VENTRICULAR DIMENSION IN END SYSTOLE: -3.47

## 2023-11-13 PROCEDURE — 80048 BASIC METABOLIC PNL TOTAL CA: CPT | Performed by: STUDENT IN AN ORGANIZED HEALTH CARE EDUCATION/TRAINING PROGRAM

## 2023-11-13 PROCEDURE — 25000003 PHARM REV CODE 250: Performed by: STUDENT IN AN ORGANIZED HEALTH CARE EDUCATION/TRAINING PROGRAM

## 2023-11-13 PROCEDURE — 97530 THERAPEUTIC ACTIVITIES: CPT

## 2023-11-13 PROCEDURE — 84484 ASSAY OF TROPONIN QUANT: CPT | Performed by: NURSE PRACTITIONER

## 2023-11-13 PROCEDURE — 97166 OT EVAL MOD COMPLEX 45 MIN: CPT

## 2023-11-13 PROCEDURE — 93010 ELECTROCARDIOGRAM REPORT: CPT | Mod: ,,, | Performed by: STUDENT IN AN ORGANIZED HEALTH CARE EDUCATION/TRAINING PROGRAM

## 2023-11-13 PROCEDURE — 93010 EKG 12-LEAD: ICD-10-PCS | Mod: ,,, | Performed by: STUDENT IN AN ORGANIZED HEALTH CARE EDUCATION/TRAINING PROGRAM

## 2023-11-13 PROCEDURE — 84481 FREE ASSAY (FT-3): CPT | Performed by: NURSE PRACTITIONER

## 2023-11-13 PROCEDURE — 11000001 HC ACUTE MED/SURG PRIVATE ROOM

## 2023-11-13 PROCEDURE — 93005 ELECTROCARDIOGRAM TRACING: CPT

## 2023-11-13 PROCEDURE — 63600175 PHARM REV CODE 636 W HCPCS: Performed by: NURSE PRACTITIONER

## 2023-11-13 PROCEDURE — 97162 PT EVAL MOD COMPLEX 30 MIN: CPT

## 2023-11-13 PROCEDURE — 85027 COMPLETE CBC AUTOMATED: CPT | Performed by: STUDENT IN AN ORGANIZED HEALTH CARE EDUCATION/TRAINING PROGRAM

## 2023-11-13 RX ORDER — ENOXAPARIN SODIUM 100 MG/ML
40 INJECTION SUBCUTANEOUS EVERY 12 HOURS
Status: DISCONTINUED | OUTPATIENT
Start: 2023-11-14 | End: 2023-11-17 | Stop reason: HOSPADM

## 2023-11-13 RX ORDER — HYDRALAZINE HYDROCHLORIDE 20 MG/ML
10 INJECTION INTRAMUSCULAR; INTRAVENOUS EVERY 6 HOURS PRN
Status: DISCONTINUED | OUTPATIENT
Start: 2023-11-13 | End: 2023-11-17 | Stop reason: HOSPADM

## 2023-11-13 RX ORDER — LABETALOL HYDROCHLORIDE 5 MG/ML
10 INJECTION, SOLUTION INTRAVENOUS EVERY 6 HOURS PRN
Status: DISCONTINUED | OUTPATIENT
Start: 2023-11-13 | End: 2023-11-17 | Stop reason: HOSPADM

## 2023-11-13 RX ADMIN — HYDRALAZINE HYDROCHLORIDE 10 MG: 20 INJECTION INTRAMUSCULAR; INTRAVENOUS at 08:11

## 2023-11-13 RX ADMIN — LEVETIRACETAM 500 MG: 500 TABLET, FILM COATED ORAL at 08:11

## 2023-11-13 RX ADMIN — LEVETIRACETAM 500 MG: 500 TABLET, FILM COATED ORAL at 10:11

## 2023-11-13 NOTE — PROGRESS NOTES
"   Trauma Surgery   Progress Note  Admit Date: 11/12/2023  HD#1  POD#* No surgery found *    Subjective:   Interval history:  AFVSS, some PACs noted. C/o left wirst pain while working with therapy. Adds that she was a little light headed prior to her fall     No PMH     Home Meds: No current outpatient medications   Scheduled Meds:   levETIRAcetam  500 mg Oral BID     Continuous Infusions:  PRN Meds:hydrALAZINE, labetaloL, morphine, ondansetron, oxyCODONE, sodium chloride 0.9%     Objective:     VITAL SIGNS: 24 HR MIN & MAX LAST   Temp  Min: 97.3 °F (36.3 °C)  Max: 98.4 °F (36.9 °C)  97.3 °F (36.3 °C)   BP  Min: 108/58  Max: 168/70  (!) 108/58    Pulse  Min: 67  Max: 99  80    Resp  Min: 15  Max: 29  20    SpO2  Min: 94 %  Max: 98 %  96 %      HT: 5' 3" (160 cm)  WT: 54.4 kg (120 lb)  BMI: 21.3     Intake/output:  Intake/Output - Last 3 Shifts         11/11 0700 11/12 0659 11/12 0700  11/13 0659 11/13 0700 11/14 0659    P.O.  100     I.V. (mL/kg) 270.5 (5) 2370 (43.6)     Total Intake(mL/kg) 270.5 (5) 2470 (45.4)     Urine (mL/kg/hr) 0 1350 (1)     Stool 0      Total Output 0 1350     Net +270.5 +1120            Urine Occurrence 0 x      Stool Occurrence 1 x              Intake/Output Summary (Last 24 hours) at 11/13/2023 1143  Last data filed at 11/13/2023 0617  Gross per 24 hour   Intake 2221.52 ml   Output 1350 ml   Net 871.52 ml         Lines/drains/airway:       Peripheral IV - Single Lumen 11/11/23 20 G Left;Posterior Forearm (Active)   Site Assessment Clean;Dry;Intact 11/13/23 0800   Extremity Assessment Distal to IV No abnormal discoloration 11/13/23 0800   Line Status Saline locked 11/13/23 0800   Dressing Status Clean;Dry;Intact 11/13/23 0800   Dressing Intervention Integrity maintained 11/13/23 0800   Number of days: 2            Peripheral IV - Single Lumen 11/12/23 0325 20 G Anterior;Proximal;Right Forearm (Active)   Site Assessment Clean;No redness;Dry;Intact;No swelling 11/13/23 0800   Extremity " "Assessment Distal to IV No abnormal discoloration 11/13/23 0800   Line Status Saline locked 11/13/23 0800   Dressing Status Clean;Dry;Intact 11/13/23 0800   Dressing Intervention Integrity maintained 11/13/23 0800   Number of days: 1       Female External Urinary Catheter 11/12/23 0325 (Active)   Skin no redness;no breakdown 11/13/23 0800   Tolerance no signs/symptoms of discomfort 11/13/23 0800   Suction Continuous suction at 70 mmHg 11/13/23 0800   Date of last wick change 11/12/23 11/13/23 0000   Time of last wick change 2235 11/13/23 0000   Output (mL) 200 mL 11/13/23 0557   Number of days: 1       Physical examination:  Gen: NAD, AAOx3, answering questions appropriately  HEENT: left periorbital ecchymosis   CV: RR  Resp: NWOB  Abd: S/NT/ND  Msk: moving all extremities spontaneously and purposefully  Neuro: CN II-XII grossly intact  Skin/wounds: warm dry     Labs:  Renal:  Recent Labs     11/12/23 0046 11/12/23 0658 11/13/23 0150   BUN 15.6 16.0 13.0   CREATININE 0.79 0.89 0.74     No results for input(s): "LACTIC" in the last 72 hours.  FEN/GI:  Recent Labs     11/12/23 0046 11/12/23 0658 11/13/23 0150    139 140   K 3.8 3.9 3.5   CO2 26 29 29   CALCIUM 9.4 9.2 8.7   ALBUMIN 3.3*  --   --    BILITOT 1.1  --   --    AST 19  --   --    ALKPHOS 74  --   --    ALT 15  --   --      Heme:  Recent Labs     11/12/23 0046 11/12/23 0658 11/13/23  0150   HGB 12.4 12.3 12.0   HCT 36.3* 36.8* 36.8*    224 216   PTT 25.6  --   --    INR 1.0  --   --      ID:  Recent Labs     11/12/23 0046 11/12/23 0658 11/13/23  0150   WBC 14.03* 12.55* 10.12     CBG:  Recent Labs     11/12/23 0046 11/12/23 0658 11/13/23  0150   GLUCOSE 128* 101 90      No results for input(s): "POCTGLUCOSE" in the last 72 hours.   Cardiovascular:  Recent Labs   Lab 11/13/23  0150   TROPONINI 0.025     I have reviewed all pertinent lab results within the past 24 hours.    Imaging:  CT Head Without Contrast   Final Result      Stable " areas of subdural and subarachnoid hemorrhage as outlined above stable since prior examination      Pansinusitis         Electronically signed by: Chino Santana   Date:    11/12/2023   Time:    08:38      CT Head Without Contrast   Final Result      Findings seen consistent with subdural hemorrhage in the left parietal and occipital region as well as subarachnoid hemorrhage in the left parietal region at the centrum semiovale and small amount of blood the along the  inferior aspect of the left sylvian fissure.  Findings are unchanged since the prior examination.         Electronically signed by: Chino Santana   Date:    11/12/2023   Time:    08:41      Xray Previous   Final Result      CT Previous   Final Result      CT Previous   Final Result      CT Previous   Final Result      X-Ray Wrist 2 View Left    (Results Pending)      I have reviewed all pertinent imaging results/findings within the past 24 hours.    Micro/Path/Other:  Microbiology Results (last 7 days)       ** No results found for the last 168 hours. **           Specimen (168h ago, onward)      None             Problems list:  Active Problem List with Overview Notes    Diagnosis Date Noted    Traumatic subdural hematoma without loss of consciousness 11/12/2023    Closed fracture of left orbital floor 11/12/2023    Closed fracture of left side of maxilla with routine healing 11/12/2023        Assessment & Plan:   89-year-old female with no apparent past medical history reportedly takes no medications who fell at home twice earlier today.  She does not take any blood thinners.  CT scans revealed comminuted fracture of the left anterior, posterior, and medial maxillary sinus wall, posterior floor of the orbit, medial maxillary sinus wall and a subtle left frontal subdural hematoma.   Consults:   Neurosurgery  Ophthalmology   Plastic Surgery   Therapy:  Physical Therapy  Occupational Therapy Weight bearing status:   RUE: WBAT  LUE: WBAT  RLE:  WBAT  LLE: WBAT Precautions:  Seizure and Standard   Seizure prophylaxis:  Keppra (day 2/7) VTE prophylaxis:     SCDs and Defer chemoprophylaxis to Neurosurgery   GI prophylaxis:  Not indicated. Tolerating ordered diet.   Outpatient follow up:  PCP Disposition:  Pending progress with therapy     - Regular diet  - Daily labs  - FU trop, echo, and thyroid for dizziness p/t fall   - fu left wrist xray   - pain control PRN  - IS  - Therapy as above  - VTE prevention as above   - dispo pending progress with therapy     STERLING [facility]   Trauma/Acute Care Surgery  [facility]  C: [phone]

## 2023-11-13 NOTE — PLAN OF CARE
Problem: Occupational Therapy  Goal: Occupational Therapy Goal  Description: LTG: Pt will perform basic ADLs and ADL transfers with Modified independence using LRAD by discharge.    STG: to be met by 12/13/23    Pt will complete grooming standing at sink with LRAD with SBA.  Pt will complete UB dressing with SBA.  Pt will complete LB dressing with SBA using LRAD.  Pt will complete toileting with SBA using LRAD.  Pt will complete functional mobility to/from toilet and toilet transfer with SBA using LRAD.   Outcome: Ongoing, Progressing

## 2023-11-13 NOTE — PLAN OF CARE
Updated pts daughter that [facility] in [place] does not take insurer but their sister organization [facility] does. Riddhi prefers not to go to [place] and would like for  to see if a location in [place] would take insurer  FOC signed for the following in order of preference [facility]. Romie santos [facility], vets home by daughter   Referral sent through [facility] to [facility].

## 2023-11-13 NOTE — PLAN OF CARE
Problem: Adult Inpatient Plan of Care  Goal: Patient-Specific Goal (Individualized)  11/12/2023 1910 by Bancroft, Alexander, RN  Outcome: Ongoing, Progressing  11/12/2023 1910 by Bancroft, Alexander, RN  Outcome: Ongoing, Progressing     Problem: Impaired Wound Healing  Goal: Optimal Wound Healing  11/12/2023 1910 by Bancroft, Alexander, RN  Outcome: Ongoing, Progressing  11/12/2023 1910 by Bancroft, Alexander, RN  Outcome: Ongoing, Progressing     Problem: Fall Injury Risk  Goal: Absence of Fall and Fall-Related Injury  11/12/2023 1910 by Bancroft, Alexander, RN  Outcome: Ongoing, Progressing  11/12/2023 1910 by Bancroft, Alexander, RN  Outcome: Ongoing, Progressing     Problem: Skin Injury Risk Increased  Goal: Skin Health and Integrity  Outcome: Ongoing, Progressing

## 2023-11-13 NOTE — PLAN OF CARE
[facility] sent referral to [facility] for SNF placement.  [facility] called ,awaiting response..

## 2023-11-13 NOTE — PT/OT/SLP EVAL
Physical Therapy Evaluation    Patient Name:  Georgeanna P Lejeune   MRN:  [id number]    Recommendations:     Discharge therapy intensity: Moderate Intensity Therapy   Discharge Equipment Recommendations:  (TBD)   Barriers to discharge: Impaired mobility    Assessment:     Georgeanna P Lejeune is a 89 y.o. female admitted with a medical diagnosis of Closed fracture of left orbital floor.  Pt with small (L) SDH. She presents with the following impairments/functional limitations: weakness, impaired balance, decreased coordination, decreased safety awareness, impaired endurance, impaired functional mobility, gait instability . Pt tolerated session well, limited due to pain and decreased endurance. BP stable throughout session; however, complaints of dizziness during ambulation requiring seated rest break. Pt with = strength and intact coordination/sensation BLE. Pt required Min-ModA for t/f and 15' ambulation with RW. Pt lives alone and would benefit from moderate level therapy to improve functional independence to reduce risk for falls.     Rehab Prognosis: Good; patient would benefit from acute skilled PT services to address these deficits and reach maximum level of function.    Recent Surgery: * No surgery found *      Plan:     During this hospitalization, patient to be seen 6 x/week to address the identified rehab impairments via gait training, therapeutic activities, therapeutic exercises and progress toward the following goals:    Plan of Care Expires:  12/16/23    Subjective     Chief Complaint: headache  Patient/Family Comments/goals: to return to PLOF  Pain/Comfort:  Pain Rating 1: 8/10  Location 1: head    Patients cultural, spiritual, Yazidism conflicts given the current situation:      Living Environment:  Pt lives alone in a SLH with flat entrance. 2 falls over the past week.  Prior to admission, patients level of function was Fany with QC.  Equipment used at home: cane, quad, shower chair.  DME owned  (not currently used): none.  Upon discharge, patient will have assistance from TBD.    Objective:     Communicated with RN prior to session.  Patient found HOB elevated with telemetry, peripheral IV, PureWick, blood pressure cuff  upon PT entry to room.    General Precautions: Standard, fall (100-150)  Orthopedic Precautions:N/A   Braces: N/A  Respiratory Status: Room air  Blood Pressure: 126/56, 132/73 post ambulation      Exams:  Cognitive Exam:  Patient is oriented to Person, Place, Time, and Situation  Fine Motor Coordination:    -       Intact  Left hand, finger to nose, Right hand, finger to nose, Left hand thumb/finger opposition skills, Right hand thumb/finger opposition skills, and Rapid alternating ankle DF/PF  Sensation:    -       Intact  RLE Strength: Deficits: 4 into all major planes  LLE Strength: Deficits: 4 into all major planes  Skin integrity:  significant ecchymosis and abrasions to (L) side of face      Functional Mobility:  Bed Mobility:     Supine to Sit: moderate assistance  Transfers:     Sit to Stand:  moderate assistance with rolling walker  Gait: Pt ambulated 15' with RW Heather before requiring seated rest break. Poor AD management requiring tactile cuing for navigation.       AM-PAC 6 CLICK MOBILITY  Total Score:18       Treatment & Education:    Patient provided with verbal education education regarding PT POC.  Understanding was verbalized.     Patient left up in chair with all lines intact, call button in reach, and RN notified.    GOALS:   Multidisciplinary Problems       Physical Therapy Goals          Problem: Physical Therapy    Goal Priority Disciplines Outcome Goal Variances Interventions   Physical Therapy Goal     PT, PT/OT Ongoing, Progressing     Description: Goals to be met by: d/c     Patient will increase functional independence with mobility by performin. Supine to sit with Stand-by Assistance  2. Sit to supine with Stand-by Assistance  3. Sit to stand transfer with  Stand-by Assistance  4. Bed to chair transfer with Stand-by Assistance using Rolling Walker  5. Gait  x 150 feet with Stand-by Assistance using Rolling Walker.                          History:     History reviewed. No pertinent past medical history.    History reviewed. No pertinent surgical history.    Time Tracking:     PT Received On: 11/13/23  PT Start Time: 0845     PT Stop Time: 0915  PT Total Time (min): 30 min     Billable Minutes: Evaluation 22 and Therapeutic Activity 8      11/13/2023

## 2023-11-13 NOTE — PLAN OF CARE
Problem: Physical Therapy  Goal: Physical Therapy Goal  Description: Goals to be met by: d/c     Patient will increase functional independence with mobility by performin. Supine to sit with Stand-by Assistance  2. Sit to supine with Stand-by Assistance  3. Sit to stand transfer with Stand-by Assistance  4. Bed to chair transfer with Stand-by Assistance using Rolling Walker  5. Gait  x 150 feet with Stand-by Assistance using Rolling Walker.     Outcome: Ongoing, Progressing

## 2023-11-13 NOTE — NURSING
Nurses Note -- 4 Eyes      11/13/2023   5:32 PM      Skin assessed during: Admit      [] No Altered Skin Integrity Present    []Prevention Measures Documented      [x] Yes- Altered Skin Integrity Present or Discovered   [x] LDA Added if Not in Epic (Describe Wound)   [] New Altered Skin Integrity was Present on Admit and Documented in LDA   [x] Wound Image Taken    Wound Care Consulted? No    Attending Nurse:  Cheri Ventura RN/Staff Member:   LLOYD Albret

## 2023-11-13 NOTE — PLAN OF CARE
Spoke with daughter Carisse LeJeune  who resides in [place]. She is at bedside.   Pt lives by self, single level home, no steps. Has very attentive neighbors who are child mejía friends and retired teachers. One lives next door and the other across the street.They travel together, provide transportation for pt who no longer drives. Pt lives independently, used quad cane when outside the home. She does use the grocery cart for support when shopping. Pt also goes to [place] to stay with Riddhi several times per year. Riddhi has asked her to move to her home in [place] but has declined.   Pt has PCP Suzette Kuo  Therapy had pt up and she is very fatigued , skilled is likely needed. FOC signed for [facility] in [place]  Referral sent and spoke with Noemy  she anticipates that they are not in network with pts insurer but their sister facility [facility] does take pts insurer. [facility] is in [place].   Pt is moving at this time to [facility]

## 2023-11-13 NOTE — PT/OT/SLP EVAL
Occupational Therapy  Evaluation    Name: Georgeanna P Lejeune  MRN: [id number]  Admitting Diagnosis: fall   Recent Surgery: * No surgery found *      Recommendations:     Discharge therapy intensity: Moderate Intensity Therapy   Discharge Equipment Recommendations:  to be determined by next level of care  Barriers to discharge:  Other (Comment) (impaired self-care and functional mobility)    Assessment:     Georgeanna P Lejeune is a 89 y.o. female with a medical diagnosis of comminuted fracture of the left anterior, posterior, and medial maxillary sinus wall, posterior floor of the orbit, medial maxillary sinus wall and a subtle left frontal subdural hematoma. She complains of L wrist pain; trauma NP notified. She is pleasant and agreeable to therapy. She presents with the following performance deficits affecting function: weakness, impaired functional mobility, impaired endurance, gait instability, pain, impaired balance, decreased upper extremity function, impaired self care skills. She required min A for functional mobility using rolling walker.     Rehab Prognosis: Good; patient would benefit from acute skilled OT services to address these deficits and reach maximum level of function.       Plan:     Patient to be seen 3 x/week to address the above listed problems via self-care/home management, therapeutic activities, therapeutic exercises  Plan of Care Expires: 12/11/23  Plan of Care Reviewed with: patient, daughter    Subjective     Chief Complaint: L wrist pain   Patient/Family Comments/goals: To return home.     Occupational Profile:  Living Environment: Pt reports living alone in a single level home with no steps to enter. Pt has a tub/shower combo with a shower chair. Daughter present at bedside and reports pt has a strong support system; she reports pt has neighbors that are willing to assist prn.   Previous level of function: Pt reports being independent with ADLs prior.   Roles and Routines: Mother,  retired teacher  Equipment Used at Home: cane, quad, shower chair  Assistance upon Discharge: Pt reports having friends and neighbors that can assist upon d/c. Daughter lives in [place].     Pain/Comfort:  Pain Rating 1: 8/10  Pain Addressed 1: Reposition, Distraction, Nurse notified    Patients cultural, spiritual, Uatsdin conflicts given the current situation: no    Objective:     OT communicated with RN prior to session.      Patient was found up in chair with blood pressure cuff, PureWick, telemetry, pulse ox (continuous), peripheral IV upon OT entry to room.    General Precautions: Standard, fall (SBP (100-150))  Orthopedic Precautions: N/A  Braces: N/A    Vital Signs: Blood Pressure: 120/50  HR: 102  Respiratory Status: on room air  With Activity: BP: 127/58; HR: 92    Bed Mobility:    Pt up in chair upon OT entry.     Functional Mobility/Transfers:  Patient completed Sit <> Stand Transfer with minimum assistance  with  rolling walker   Patient completed Chair Transfer using Step Transfer technique with minimum assistance with rolling walker  Patient completed Toilet Transfer Step Transfer technique with minimum assistance with  rolling walker  Functional Mobility: Pt ambulated within room with min A using rolling walker to complete ADL tasks.     Activities of Daily Living:  Lower Body Dressing: minimum assistance for sock management while sitting up in chair.   Toileting: minimum assistance to complete toilet transfer using rolling walker.     AMPAC 6 Click ADL:  AMPAC Total Score: 20    Functional Cognition:  Command Following: Intact  Communication: clear/fluent  Safety Awareness: Impaired.      Visual Perceptual Skills:  Continue to assess.     Upper Extremity Function:  Right Upper Extremity:   Range of Motion: WFL  Strength: WFL  Coordination: WFL    Left Upper Extremity:  Range of Motion: WFL except L wrist pain noted.  Strength: WFL except L wrist pain noted.   Coordination: WFL  L wrist pain  reported; trauma NP present and notified.     Balance:   Static standing balance:Impaired.    Dynamic standing balance:Impaired.      Therapeutic Positioning  Risk for acquired pressure injuries is increased due to relative decrease in mobility d/t hospitalization .    OT interventions performed during the course of today's session:   Education was provided on benefits of and recommendations for therapeutic positioning    Skin assessment: all bony prominences were assessed    Findings:  Facial bruising and lacerations noted.    OT recommendations for therapeutic positioning throughout hospitalization:   Follow [facility] Pressure Injury Prevention Protocol      Patient Education:  Patient and daughter/s were provided with verbal education education regarding OT role/goals/POC, fall prevention, safety awareness, and Discharge/DME recommendations.  Understanding was verbalized, however additional teaching warranted.     Patient left up in chair with all lines intact, call button in reach, RN notified, and daughter present    GOALS:   Multidisciplinary Problems       Occupational Therapy Goals          Problem: Occupational Therapy    Goal Priority Disciplines Outcome Interventions   Occupational Therapy Goal     OT, PT/OT Ongoing, Progressing    Description: LTG: Pt will perform basic ADLs and ADL transfers with Modified independence using LRAD by discharge.    STG: to be met by 12/13/23    Pt will complete grooming standing at sink with LRAD with SBA.  Pt will complete UB dressing with SBA.  Pt will complete LB dressing with SBA using LRAD.  Pt will complete toileting with SBA using LRAD.  Pt will complete functional mobility to/from toilet and toilet transfer with SBA using LRAD.                        History:     History reviewed. No pertinent past medical history.    History reviewed. No pertinent surgical history.    Time Tracking:     OT Date of Treatment: 11/13/23  OT Start Time: 1027  OT Stop Time: 1100  OT Total  Time (min): 33 min    Billable Minutes:Evaluation Moderate Complexity.     11/13/2023

## 2023-11-14 LAB
ALBUMIN SERPL-MCNC: 2.7 G/DL (ref 3.4–4.8)
ALBUMIN/GLOB SERPL: 1.1 RATIO (ref 1.1–2)
ALP SERPL-CCNC: 67 UNIT/L (ref 40–150)
ALT SERPL-CCNC: 7 UNIT/L (ref 0–55)
AST SERPL-CCNC: 10 UNIT/L (ref 5–34)
BILIRUB SERPL-MCNC: 1 MG/DL
BUN SERPL-MCNC: 11.8 MG/DL (ref 9.8–20.1)
CALCIUM SERPL-MCNC: 9.1 MG/DL (ref 8.4–10.2)
CHLORIDE SERPL-SCNC: 106 MMOL/L (ref 98–111)
CO2 SERPL-SCNC: 27 MMOL/L (ref 23–31)
CREAT SERPL-MCNC: 0.75 MG/DL (ref 0.55–1.02)
ERYTHROCYTE [DISTWIDTH] IN BLOOD BY AUTOMATED COUNT: 14.1 % (ref 11.5–17)
GFR SERPLBLD CREATININE-BSD FMLA CKD-EPI: >60 MLS/MIN/1.73/M2
GLOBULIN SER-MCNC: 2.5 GM/DL (ref 2.4–3.5)
GLUCOSE SERPL-MCNC: 98 MG/DL (ref 75–121)
HCT VFR BLD AUTO: 38.6 % (ref 37–47)
HGB BLD-MCNC: 12.6 G/DL (ref 12–16)
MCH RBC QN AUTO: 31.3 PG (ref 27–31)
MCHC RBC AUTO-ENTMCNC: 32.6 G/DL (ref 33–36)
MCV RBC AUTO: 95.8 FL (ref 80–94)
NRBC BLD AUTO-RTO: 0 %
PLATELET # BLD AUTO: 247 X10(3)/MCL (ref 130–400)
PMV BLD AUTO: 11.5 FL (ref 7.4–10.4)
POTASSIUM SERPL-SCNC: 3.7 MMOL/L (ref 3.5–5.1)
PROT SERPL-MCNC: 5.2 GM/DL (ref 5.8–7.6)
RBC # BLD AUTO: 4.03 X10(6)/MCL (ref 4.2–5.4)
SODIUM SERPL-SCNC: 140 MMOL/L (ref 132–146)
T3FREE SERPL-MCNC: 1.82 PG/ML (ref 1.58–3.91)
TSH SERPL-ACNC: 2.19 UIU/ML (ref 0.35–4.94)
WBC # SPEC AUTO: 9.12 X10(3)/MCL (ref 4.5–11.5)

## 2023-11-14 PROCEDURE — 84443 ASSAY THYROID STIM HORMONE: CPT | Performed by: NURSE PRACTITIONER

## 2023-11-14 PROCEDURE — 97116 GAIT TRAINING THERAPY: CPT

## 2023-11-14 PROCEDURE — 85027 COMPLETE CBC AUTOMATED: CPT | Performed by: STUDENT IN AN ORGANIZED HEALTH CARE EDUCATION/TRAINING PROGRAM

## 2023-11-14 PROCEDURE — 80053 COMPREHEN METABOLIC PANEL: CPT

## 2023-11-14 PROCEDURE — 97110 THERAPEUTIC EXERCISES: CPT

## 2023-11-14 PROCEDURE — 97530 THERAPEUTIC ACTIVITIES: CPT

## 2023-11-14 PROCEDURE — 25000003 PHARM REV CODE 250

## 2023-11-14 PROCEDURE — 11000001 HC ACUTE MED/SURG PRIVATE ROOM

## 2023-11-14 PROCEDURE — 25000003 PHARM REV CODE 250: Performed by: SURGERY

## 2023-11-14 PROCEDURE — 63600175 PHARM REV CODE 636 W HCPCS: Performed by: NURSE PRACTITIONER

## 2023-11-14 PROCEDURE — 25000003 PHARM REV CODE 250: Performed by: STUDENT IN AN ORGANIZED HEALTH CARE EDUCATION/TRAINING PROGRAM

## 2023-11-14 RX ORDER — MUPIROCIN 20 MG/G
OINTMENT TOPICAL 2 TIMES DAILY
Status: DISCONTINUED | OUTPATIENT
Start: 2023-11-14 | End: 2023-11-17 | Stop reason: HOSPADM

## 2023-11-14 RX ORDER — DOCUSATE SODIUM 100 MG/1
100 CAPSULE, LIQUID FILLED ORAL 2 TIMES DAILY
Status: DISCONTINUED | OUTPATIENT
Start: 2023-11-14 | End: 2023-11-17 | Stop reason: HOSPADM

## 2023-11-14 RX ORDER — POLYETHYLENE GLYCOL 3350 17 G/17G
17 POWDER, FOR SOLUTION ORAL 2 TIMES DAILY
Status: DISCONTINUED | OUTPATIENT
Start: 2023-11-14 | End: 2023-11-17 | Stop reason: HOSPADM

## 2023-11-14 RX ADMIN — POLYETHYLENE GLYCOL 3350 17 G: 17 POWDER, FOR SOLUTION ORAL at 12:11

## 2023-11-14 RX ADMIN — MUPIROCIN: 20 OINTMENT TOPICAL at 09:11

## 2023-11-14 RX ADMIN — DOCUSATE SODIUM 100 MG: 100 CAPSULE, LIQUID FILLED ORAL at 10:11

## 2023-11-14 RX ADMIN — ENOXAPARIN SODIUM 40 MG: 40 INJECTION SUBCUTANEOUS at 10:11

## 2023-11-14 RX ADMIN — LEVETIRACETAM 500 MG: 500 TABLET, FILM COATED ORAL at 09:11

## 2023-11-14 RX ADMIN — POLYETHYLENE GLYCOL 3350 17 G: 17 POWDER, FOR SOLUTION ORAL at 10:11

## 2023-11-14 RX ADMIN — ENOXAPARIN SODIUM 40 MG: 40 INJECTION SUBCUTANEOUS at 09:11

## 2023-11-14 RX ADMIN — DOCUSATE SODIUM 100 MG: 100 CAPSULE, LIQUID FILLED ORAL at 12:11

## 2023-11-14 NOTE — PT/OT/SLP PROGRESS
Physical Therapy Treatment    Patient Name:  Georgeanna P Lejeune   MRN:  [id number]    Recommendations:     Discharge therapy intensity: Moderate Intensity Therapy   Discharge Equipment Recommendations: to be determined by next level of care  Barriers to discharge: Decreased caregiver support and Impaired mobility    Assessment:     Georgeanna P Lejeune is a 89 y.o. female admitted with a medical diagnosis of Closed fracture of left orbital floor, maxillary sinus fx, SDH, L ulnar styloid fx s/p fall.  She presents with the following impairments/functional limitations: weakness, impaired endurance, impaired self care skills, impaired functional mobility, gait instability, impaired balance, decreased upper extremity function. Pt easily fatigued but provided good effort throughout session. Pt without brace at time of session, contacted OT and she ordered necessary brace. PT observed NWB to LUE throughout session using HHA for gait and transfers.    Rehab Prognosis: Good; patient would benefit from acute skilled PT services to address these deficits and reach maximum level of function.    Recent Surgery: * No surgery found *      Plan:     During this hospitalization, patient to be seen 5 x/week to address the identified rehab impairments via gait training, therapeutic activities, therapeutic exercises and progress toward the following goals:    Plan of Care Expires:  12/16/23    Subjective     Chief Complaint: daughter with questions regarding meds and fatigue  Patient/Family Comments/goals: return home  Pain/Comfort:  Pain Rating 1: 6/10      Objective:     Communicated with nurse prior to session.  Patient found HOB elevated with telemetry, pulse ox (continuous), peripheral IV, PureWick upon PT entry to room.     General Precautions: Standard, fall, seizure (-150)  Orthopedic Precautions: N/A  Braces: N/A  Respiratory Status: Room air    Functional Mobility:  Bed Mobility:     Rolling Left:  minimum  assistance  Scooting: minimum assistance  Supine to Sit: minimum assistance  Transfers:     Sit to Stand:  minimum assistance with hand-held assist  Toilet Transfer: minimum assistance with  hand-held assist  using  Step Transfer  Gait: 20ft x 2 trials with HHA on L. Verbal cues for forward gaze. Step through gait pattern  Balance: static sitting balance SBA    Therapeutic Activities/Exercises:  Pt sat EOB x 15 min with SBA performing seated LE there-ex  Education provided to daughter regarding PT POC  Balance activities performed with bending/reaching tasks throughout MATHEUS performing self care and grooming activities.    Patient left up in chair with all lines intact, call button in reach, nurse notified, and daughter  present..    GOALS:   Multidisciplinary Problems       Physical Therapy Goals          Problem: Physical Therapy    Goal Priority Disciplines Outcome Goal Variances Interventions   Physical Therapy Goal     PT, PT/OT Ongoing, Progressing     Description: Goals to be met by: d/c     Patient will increase functional independence with mobility by performin. Supine to sit with Stand-by Assistance  2. Sit to supine with Stand-by Assistance  3. Sit to stand transfer with Stand-by Assistance  4. Bed to chair transfer with Stand-by Assistance using Rolling Walker  5. Gait  x 150 feet with Stand-by Assistance using Rolling Walker.                          Time Tracking:     PT Received On: 23  PT Start Time: 1425     PT Stop Time: 1520  PT Total Time (min): 55 min     Billable Minutes: Gait Training 15, Therapeutic Activity 25, and Therapeutic Exercise 15    Treatment Type: Treatment  PT/PTA: PT     Number of PTA visits since last PT visit: 2023

## 2023-11-14 NOTE — TERTIARY TRAUMA SURVEY NOTE
TERTIARY TRAUMA SURVEY (TTS)    List Injuries Identified to Date:   1. Maxillary sinus fx   2. L orbital floor fx  3. SDH  4. L ulnar styloid fx     [x]Problems list reviewed  List Operations and Procedures:   1. none    Incidental findings:   1. none    Past Medical History:     History reviewed. No pertinent past medical history.    Tertiary Physical Exam:     Physical Exam  HENT:      Head: Normocephalic.   Eyes:      Extraocular Movements: Extraocular movements intact.      Comments: Abrasions near L eye. Periorbital ecchymosis    Cardiovascular:      Rate and Rhythm: Normal rate.      Pulses: Normal pulses.   Pulmonary:      Effort: Pulmonary effort is normal.   Abdominal:      General: Abdomen is flat.      Palpations: Abdomen is soft.   Musculoskeletal:         General: Tenderness and signs of injury present.      Cervical back: Normal range of motion.   Skin:     General: Skin is warm and dry.   Neurological:      Mental Status: She is alert. Mental status is at baseline.         Imaging Review:     Imaging Results              CT Head Without Contrast (Final result)  Result time 11/12/23 08:41:55      Final result by Chino Santana MD (11/12/23 08:41:55)                   Impression:      Findings seen consistent with subdural hemorrhage in the left parietal and occipital region as well as subarachnoid hemorrhage in the left parietal region at the centrum semiovale and small amount of blood the along the  inferior aspect of the left sylvian fissure.  Findings are unchanged since the prior examination.      Electronically signed by: Chino Santana  Date:    11/12/2023  Time:    08:41               Narrative:    EXAMINATION:  CT HEAD WITHOUT CONTRAST    CLINICAL HISTORY:  Subdural hemorrhage;    TECHNIQUE:  Multiple axial images were obtained from the base of the brain to the vertex without contrast administration.  Sagittal and coronal reconstructions were performed. .Automatic exposure  control  (AEC) is utilized to reduce patient radiation exposure.    COMPARISON:  11/11/2023 from an outside institution    FINDINGS:  There is some cerebral atrophy seen consistent with patient's age. There is some compensatory ventricular dilatation and periventricular white matter change consistent with patient's age.  There is an area of calcification seen in the left parietal region near the cerebral convexity.    There is a subdural hematoma seen in the left posterior parietal region extending into the occipital region.  Maximum dimension of the subdural hematoma is 7.2 mm.  This was seen on the prior examination as well and appears unchanged.  There is some subarachnoid hemorrhage seen in the left parietal region near the centrum semiovale.  This appears similar to the prior examination. There is small amount of blood seen in along the inferior aspect the sylvian fissure on the left side.  This appears unchanged.  No new areas of hemorrhage are seen.  The calvarium is intact.    There is evidence of pansinusitis                                       Lab Review:   CBC:  Recent Labs   Lab Result Units 11/12/23  0046 11/12/23 0658 11/13/23  0150   WBC x10(3)/mcL 14.03* 12.55* 10.12   RBC x10(6)/mcL 3.91* 3.87* 3.83*   Hgb g/dL 12.4 12.3 12.0   Hct % 36.3* 36.8* 36.8*   Platelet x10(3)/mcL 246 224 216   MCV fL 92.8 95.1* 96.1*   MCH pg 31.7* 31.8* 31.3*   MCHC g/dL 34.2 33.4 32.6*       CMP:  Recent Labs   Lab Result Units 11/12/23 0046 11/12/23 0658 11/13/23  0150   Calcium Level Total mg/dL 9.4   < > 8.7   Albumin Level g/dL 3.3*  --   --    Sodium Level mmol/L 137   < > 140   Potassium Level mmol/L 3.8   < > 3.5   Carbon Dioxide mmol/L 26   < > 29   Blood Urea Nitrogen mg/dL 15.6   < > 13.0   Creatinine mg/dL 0.79   < > 0.74   Alkaline Phosphatase unit/L 74  --   --    Alanine Aminotransferase unit/L 15  --   --    Aspartate Aminotransferase unit/L 19  --   --    Bilirubin Total mg/dL 1.1  --   --     < > =  "values in this interval not displayed.       Troponin:  Recent Labs   Lab Result Units 11/13/23  0150   Troponin-I ng/mL 0.025       ETOH:  No results for input(s): "ETHANOL" in the last 72 hours.     Urine Drug Screen:  No results for input(s): "COCAINE", "OPIATE", "BARBITURATE", "AMPHETAMINE", "FENTANYL", "CANNABINOIDS", "MDMA" in the last 72 hours.    Invalid input(s): "BENZODIAZEPINE", "PHENCYCLIDINE"   Plan:     Regular diet  Lovenox  Keppra   MM pain control   Daily labs --> Mth  SBP <150  Ortho consulted for ulnar styloid fx  Optho recommending outpatient follow up  PT/OT - moderate intensity  TSH normal    CM following     11/14/2023 5:32 AM     The above findings, diagnostics, and treatment plan were discussed with Dr. Tevin Lee who will follow with further assessments and recommendations. Please call with any questions, concerns, or clinical status changes.  This note/OR report was created with the assistance of  voice recognition software or phone  dictation.  There may be transcription errors as a result of using this technology however minimal. Effort has been made to assure accuracy of transcription but any obvious errors or omissions should be clarified with the author of the document.    "

## 2023-11-14 NOTE — PLAN OF CARE
[facility] received a call from [facility] representative Ramandeep that if Ms Lejeune would come to there facility she would have to pay 30%

## 2023-11-14 NOTE — PROGRESS NOTES
Patient has a nonoperative left distal ulna fracture. Velcro wrist brace, no lifting restrictions. Okay to use left hand for walker. Formal consult to follow

## 2023-11-14 NOTE — PLAN OF CARE
Problem: Physical Therapy  Goal: Physical Therapy Goal  Description: Goals to be met by: d/c     Patient will increase functional independence with mobility by performin. Supine to sit with Stand-by Assistance  2. Sit to supine with Stand-by Assistance  3. Sit to stand transfer with Stand-by Assistance  4. Bed to chair transfer with Stand-by Assistance using Rolling Walker  5. Gait  x 150 feet with Stand-by Assistance using Rolling Walker.     2023 1627 by Lanie Varela, PT  Outcome: Ongoing, Progressing  2023 1626 by Lanie Varela, PT  Outcome: Ongoing, Progressing

## 2023-11-14 NOTE — CONSULTS
Ophthalmology Initial Consult Note    Patient Name: Georgeanna P Lejeune  Age: 89 y.o.  : 1928  MRN: [id number]  Admission Date: 2023    Reason for Consult:      Chief Complaint/Reason for Admission: fall     History of Present Illness: 89-year-old female with no apparent past medical history reportedly takes no medications who fell at home twice earlier today.  She does not take any blood thinners.  CT scans revealed comminuted fracture of the left anterior, posterior, and medial maxillary sinus wall, posterior floor of the orbit, medial maxillary sinus wall and a subtle left frontal subdural hematoma.      I have been asked to evaluate for globe injury due to facial trauma.  She denies any visual issues.      Current Facility-Administered Medications   Medication Dose Route Frequency Provider Last Rate Last Admin    [START ON 2023] enoxaparin injection 40 mg  40 mg Subcutaneous Q12H (prophylaxis, 0900/2100) Marilia Vasquez AGACNP-BC        hydrALAZINE injection 10 mg  10 mg Intravenous Q6H PRN Marilia Vasquez AGACNP-BC   10 mg at 23 0806    labetaloL injection 10 mg  10 mg Intravenous Q6H PRN Marilia Vasquez AGACNP-BC        levETIRAcetam tablet 500 mg  500 mg Oral BID Samuel Frank MD   500 mg at 23 0846    morphine injection 2 mg  2 mg Intravenous Q4H PRN Samuel Frank MD        ondansetron injection 4 mg  4 mg Intravenous Q6H PRN Samuel Frank MD        oxyCODONE immediate release tablet 5 mg  5 mg Oral Q6H PRN Samuel Frank MD        sodium chloride 0.9% flush 10 mL  10 mL Intravenous PRN Samuel Frank MD                      Review of Systems:     Pt denies diplopia or blurry vision.      Objective:         X-Ray Wrist 2 View Left   Final Result      Acute appearing nondisplaced fracture of the ulnar styloid      Old postsurgical changes seen in the radius      Diffuse soft tissue swelling in the wrist          Electronically signed by: Chino Santana   Date:    11/13/2023   Time:    17:30      CT Head Without Contrast   Final Result      Stable areas of subdural and subarachnoid hemorrhage as outlined above stable since prior examination      Pansinusitis         Electronically signed by: Chino Santana   Date:    11/12/2023   Time:    08:38      CT Head Without Contrast   Final Result      Findings seen consistent with subdural hemorrhage in the left parietal and occipital region as well as subarachnoid hemorrhage in the left parietal region at the centrum semiovale and small amount of blood the along the  inferior aspect of the left sylvian fissure.  Findings are unchanged since the prior examination.         Electronically signed by: Chino Santana   Date:    11/12/2023   Time:    08:41      Xray Previous   Final Result      CT Previous   Final Result      CT Previous   Final Result      CT Previous   Final Result        Va: 20/20 at near OU.    Pupils: PERRLA. No APD  Alignment Ortho in primary gaze  Full EOM's OU    LLL: Ecchymosis left lids and face  C/S: Subconj heme OS  K: Clear OU  AC: D+Q OU  Iris: R+R OU  Lens: 3+ NSC OU  Vitreous: Clear OU  Fundus: No disc edema or pallor OU, No retinal tears or detachment OU.       Assessment / Plan:     Non displaced orbital fractures OS with subconjunctival hemorrhage OS but no other globe injury. No signs of entrapment. Dr. Alexander agrees and advises no surgical repair at this time.    I recommend a complete eye exam outpatient after discharge. Pt lives in [place] and has no regular eye doctor yet. I recommended Dr. Emerson Peña or  Dr. Mahendra Cleaning. Pt can call my office after discharge and we can help facilitate the referral.

## 2023-11-15 PROBLEM — S52.612A TRAUMATIC CLOSED FRACTURE OF ULNAR STYLOID WITH MINIMAL DISPLACEMENT, LEFT, INITIAL ENCOUNTER: Status: ACTIVE | Noted: 2023-11-15

## 2023-11-15 PROCEDURE — 99223 PR INITIAL HOSPITAL CARE,LEVL III: ICD-10-PCS | Mod: 57,,, | Performed by: STUDENT IN AN ORGANIZED HEALTH CARE EDUCATION/TRAINING PROGRAM

## 2023-11-15 PROCEDURE — 99223 1ST HOSP IP/OBS HIGH 75: CPT | Mod: 57,,, | Performed by: STUDENT IN AN ORGANIZED HEALTH CARE EDUCATION/TRAINING PROGRAM

## 2023-11-15 PROCEDURE — 25650 CLTX ULNAR STYLOID FRACTURE: CPT | Mod: LT,,, | Performed by: STUDENT IN AN ORGANIZED HEALTH CARE EDUCATION/TRAINING PROGRAM

## 2023-11-15 PROCEDURE — 25000003 PHARM REV CODE 250

## 2023-11-15 PROCEDURE — 11000001 HC ACUTE MED/SURG PRIVATE ROOM

## 2023-11-15 PROCEDURE — 63600175 PHARM REV CODE 636 W HCPCS: Performed by: NURSE PRACTITIONER

## 2023-11-15 PROCEDURE — 97530 THERAPEUTIC ACTIVITIES: CPT

## 2023-11-15 PROCEDURE — 25650 PR CLOSED RX ULNA STYLOID FX: ICD-10-PCS | Mod: LT,,, | Performed by: STUDENT IN AN ORGANIZED HEALTH CARE EDUCATION/TRAINING PROGRAM

## 2023-11-15 RX ADMIN — MUPIROCIN: 20 OINTMENT TOPICAL at 08:11

## 2023-11-15 RX ADMIN — DOCUSATE SODIUM 100 MG: 100 CAPSULE, LIQUID FILLED ORAL at 08:11

## 2023-11-15 RX ADMIN — ENOXAPARIN SODIUM 40 MG: 40 INJECTION SUBCUTANEOUS at 09:11

## 2023-11-15 RX ADMIN — DOCUSATE SODIUM 100 MG: 100 CAPSULE, LIQUID FILLED ORAL at 09:11

## 2023-11-15 RX ADMIN — POLYETHYLENE GLYCOL 3350 17 G: 17 POWDER, FOR SOLUTION ORAL at 08:11

## 2023-11-15 RX ADMIN — POLYETHYLENE GLYCOL 3350 17 G: 17 POWDER, FOR SOLUTION ORAL at 09:11

## 2023-11-15 RX ADMIN — ENOXAPARIN SODIUM 40 MG: 40 INJECTION SUBCUTANEOUS at 08:11

## 2023-11-15 RX ADMIN — MUPIROCIN: 20 OINTMENT TOPICAL at 09:11

## 2023-11-15 NOTE — PT/OT/SLP PROGRESS
Physical Therapy Treatment    Patient Name:  Georgeanna P Lejeune   MRN:  [id number]    Recommendations:     Discharge therapy intensity: Moderate Intensity Therapy   Discharge Equipment Recommendations: walker, rolling  Barriers to discharge: Impaired mobility and Ongoing medical needs    Assessment:     Georgeanna P Lejeune is a 89 y.o. female admitted with a medical diagnosis of Closed fracture of left orbital floor, maxillary sinus fx, L frontal SDH, displaced rib fx, non op L ulnar styloid fx s/p fall.  She presents with the following impairments/functional limitations: weakness, impaired endurance, impaired self care skills, impaired functional mobility, gait instability, impaired balance, decreased upper extremity function. Pt very pleasant and tolerated PT treatment well. Pt able to use L UE with forearm splint with RW for ambulation with no complaints of discomfort.     Rehab Prognosis: Good; patient would benefit from acute skilled PT services to address these deficits and reach maximum level of function.    Recent Surgery: * No surgery found *      Plan:     During this hospitalization, patient to be seen 5 x/week to address the identified rehab impairments via gait training, therapeutic activities, therapeutic exercises, neuromuscular re-education and progress toward the following goals:    Plan of Care Expires:  12/16/23    Subjective     Chief Complaint: none  Patient/Family Comments/goals: to return to PLOF  Pain/Comfort:  Pain Rating 1: 0/10      Objective:     Communicated with nurse prior to session.  Patient found supine with PureWick, peripheral IV, pulse ox (continuous), telemetry upon PT entry to room.     General Precautions: Standard, fall, seizure  Orthopedic Precautions: Can use LUE on walker  Braces:  (L forearm splint)  Respiratory Status: Room air  Blood Pressure: 150/79; HR:83    Functional Mobility:  Bed Mobility:     Rolling Right: minimum assistance  Scooting: contact guard  assistance  Supine to Sit: minimum assistance  Transfers:     Sit to Stand:  minimum assistance with rolling walker  Toilet transfer: Min A with RW and grab bar  Gait: Pt ambulated from bed to bathroom CGA with RW needing Min A for toilet transfer. SBA for periclean. Pt then ambulated ~100 feet CGA. Verbal cues for forward gaze. (Family noted she broke her glasses during fall and has vision impairment)     Therapeutic Activities/Exercises:    Education:  Patient, daughter/s were, and friend  provided with verbal education education regarding PT POC, safety.  Understanding was verbalized.     Patient left up in chair with all lines intact, call button in reach, and daughter and friend present..    GOALS:   Multidisciplinary Problems       Physical Therapy Goals          Problem: Physical Therapy    Goal Priority Disciplines Outcome Goal Variances Interventions   Physical Therapy Goal     PT, PT/OT Ongoing, Progressing     Description: Goals to be met by: d/c     Patient will increase functional independence with mobility by performin. Supine to sit with Stand-by Assistance  2. Sit to supine with Stand-by Assistance  3. Sit to stand transfer with Stand-by Assistance  4. Bed to chair transfer with Stand-by Assistance using Rolling Walker  5. Gait  x 150 feet with Stand-by Assistance using Rolling Walker.                          Time Tracking:     PT Received On: 11/15/23  PT Start Time: 1105     PT Stop Time: 1135  PT Total Time (min): 30 min     Billable Minutes: Therapeutic Activity 30    Treatment Type: Treatment  PT/PTA: PT     Number of PTA visits since last PT visit: 2     11/15/2023

## 2023-11-15 NOTE — PROGRESS NOTES
"   Trauma Surgery   Progress Note  Admit Date: 11/12/2023  HD#3  POD#* No surgery found *    Subjective:   Interval history:  IDALIA BRAY  Worked with therapy    Home Meds: No current outpatient medications   Scheduled Meds:   docusate sodium  100 mg Oral BID    enoxparin  40 mg Subcutaneous Q12H (prophylaxis, 0900/2100)    mupirocin   Nasal BID    polyethylene glycol  17 g Oral BID     Continuous Infusions:  PRN Meds:hydrALAZINE, labetaloL, ondansetron, oxyCODONE, sodium chloride 0.9%     Objective:     VITAL SIGNS: 24 HR MIN & MAX LAST   Temp  Min: 98 °F (36.7 °C)  Max: 98.4 °F (36.9 °C)  98 °F (36.7 °C)   BP  Min: 118/65  Max: 166/76  (!) 166/76    Pulse  Min: 80  Max: 107  91    Resp  Min: 18  Max: 18  18    SpO2  Min: 94 %  Max: 97 %  95 %      HT: 5' 3" (160 cm)  WT: 54.4 kg (120 lb)  BMI: 21.3     Intake/output:  Intake/Output - Last 3 Shifts       None          No intake or output data in the 24 hours ending 11/15/23 0545        Lines/drains/airway:       Peripheral IV - Single Lumen 11/11/23 20 G Left;Posterior Forearm (Active)   Site Assessment Clean;Dry;Intact 11/13/23 0800   Extremity Assessment Distal to IV No abnormal discoloration 11/13/23 0800   Line Status Saline locked 11/13/23 0800   Dressing Status Clean;Dry;Intact 11/13/23 0800   Dressing Intervention Integrity maintained 11/13/23 0800   Number of days: 2            Peripheral IV - Single Lumen 11/12/23 0325 20 G Anterior;Proximal;Right Forearm (Active)   Site Assessment Clean;No redness;Dry;Intact;No swelling 11/13/23 0800   Extremity Assessment Distal to IV No abnormal discoloration 11/13/23 0800   Line Status Saline locked 11/13/23 0800   Dressing Status Clean;Dry;Intact 11/13/23 0800   Dressing Intervention Integrity maintained 11/13/23 0800   Number of days: 1       Female External Urinary Catheter 11/12/23 0325 (Active)   Skin no redness;no breakdown 11/13/23 0800   Tolerance no signs/symptoms of discomfort 11/13/23 0800   Suction " "Continuous suction at 70 mmHg 11/13/23 0800   Date of last wick change 11/12/23 11/13/23 0000   Time of last wick change 2235 11/13/23 0000   Output (mL) 200 mL 11/13/23 0557   Number of days: 1       Physical examination:  Gen: NAD, AAOx3, answering questions appropriately  HEENT: left periorbital ecchymosis   CV: RR  Resp: NWOB  Abd: S/NT/ND  Msk: moving all extremities spontaneously and purposefully  Neuro: CN II-XII grossly intact  Skin/wounds: warm dry     Labs:  Renal:  Recent Labs     11/12/23 0658 11/13/23  0150 11/14/23  0605   BUN 16.0 13.0 11.8   CREATININE 0.89 0.74 0.75       No results for input(s): "LACTIC" in the last 72 hours.  FEN/GI:  Recent Labs     11/12/23 0658 11/13/23 0150 11/14/23  0605    140 140   K 3.9 3.5 3.7   CO2 29 29 27   CALCIUM 9.2 8.7 9.1   ALBUMIN  --   --  2.7*   BILITOT  --   --  1.0   AST  --   --  10   ALKPHOS  --   --  67   ALT  --   --  7       Heme:  Recent Labs     11/12/23 0658 11/13/23 0150 11/14/23  0605   HGB 12.3 12.0 12.6   HCT 36.8* 36.8* 38.6    216 247       ID:  Recent Labs     11/12/23 0658 11/13/23  0150 11/14/23 0605   WBC 12.55* 10.12 9.12       CBG:  Recent Labs     11/12/23 0658 11/13/23  0150 11/14/23  0605   GLUCOSE 101 90 98        No results for input(s): "POCTGLUCOSE" in the last 72 hours.   Cardiovascular:  Recent Labs   Lab 11/13/23 0150   TROPONINI 0.025       I have reviewed all pertinent lab results within the past 24 hours.    Imaging:  CT Head Without Contrast   Final Result      1. 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 Again noted is subtle subarachnoid hemorrhage in the left frontoparietal convexity sulci (series 2, images 37 and 27), slightly reduced since the prior examination. No intraventricular hemorrhage is seen.      2. " Again noted is a 7 mm focal calcification in the left anterior centrum semiovale without perilesional edema      I concur with the preliminary report above.         Electronically signed by: Chino Santana   Date:    11/14/2023   Time:    11:06      X-Ray Wrist 2 View Left   Final Result      Acute appearing nondisplaced fracture of the ulnar styloid      Old postsurgical changes seen in the radius      Diffuse soft tissue swelling in the wrist         Electronically signed by: Chino Santana   Date:    11/13/2023   Time:    17:30      CT Head Without Contrast   Final Result      Stable areas of subdural and subarachnoid hemorrhage as outlined above stable since prior examination      Pansinusitis         Electronically signed by: Chino Santana   Date:    11/12/2023   Time:    08:38      CT Head Without Contrast   Final Result      Findings seen consistent with subdural hemorrhage in the left parietal and occipital region as well as subarachnoid hemorrhage in the left parietal region at the centrum semiovale and small amount of blood the along the  inferior aspect of the left sylvian fissure.  Findings are unchanged since the prior examination.         Electronically signed by: Chino Santana   Date:    11/12/2023   Time:    08:41      Xray Previous   Final Result      CT Previous   Final Result      CT Previous   Final Result      CT Previous   Final Result         I have reviewed all pertinent imaging results/findings within the past 24 hours.    Micro/Path/Other:  Microbiology Results (last 7 days)       ** No results found for the last 168 hours. **           Specimen (168h ago, onward)      None             Problems list:  Active Problem List with Overview Notes    Diagnosis Date Noted    Traumatic subdural hematoma without loss of consciousness 11/12/2023    Closed fracture of left orbital floor 11/12/2023    Closed fracture of left side of maxilla with routine healing 11/12/2023         Assessment & Plan:   89-year-old female with no apparent past medical history reportedly takes no medications who fell at home twice earlier today.  She does not take any blood thinners.  CT scans revealed comminuted fracture of the left anterior, posterior, and medial maxillary sinus wall, posterior floor of the orbit, medial maxillary sinus wall and a subtle left frontal subdural hematoma.   Consults:   Neurosurgery  Ophthalmology   Plastic Surgery   Therapy:  Physical Therapy  Occupational Therapy Weight bearing status:   RUE: WBAT  LUE: WBAT  RLE: WBAT  LLE: WBAT Precautions:  Seizure and Standard   Seizure prophylaxis:  Keppra (day 3/7) VTE prophylaxis:     Lovenox  GI prophylaxis:  Not indicated. Tolerating ordered diet.   Outpatient follow up:  PCP  Orthopedic surgery  Neurosurgery   Opthalmology  Disposition:  SNF     - Regular diet  - MTh labs  - pain control PRN  - IS  - Therapy as above  - VTE prevention as above   - CM - SNF, medically stable       11/15/2023 5:46 AM     The above findings, diagnostics, and treatment plan were discussed with Dr. Tevin Lee who will follow with further assessments and recommendations. Please call with any questions, concerns, or clinical status changes.  This note/OR report was created with the assistance of  voice recognition software or phone  dictation.  There may be transcription errors as a result of using this technology however minimal. Effort has been made to assure accuracy of transcription but any obvious errors or omissions should be clarified with the author of the document.

## 2023-11-15 NOTE — PLAN OF CARE
DENNIS spoke with daughter Riddhi regarding SNF.placement . Riddhi told CM she is still very interested in [facility] in [place]. CM informed daughter , Kulwant with [facility] informed  there is 30%Co pay out of pocket insurance requires.  Riddhi would like Ramandeep admitting person at [facility] to call her to discuss what is needed for mother to get placement.Ramandeep with [facility] notified of this by CM . Ramandeep will call Patient's Josee . Riddhi notified by CM. Riddhi also asked for list of sitter services. DENNIS provided Ramandeep with sitter services list.         11/16.23 [facility] accepted patient.  Awaiting insurance authorization.

## 2023-11-15 NOTE — CONSULTS
Ortho Consult    Chief Complaint: Left wrist injury    Consulting Physician: Hamzah Antunez    History of present illness:    Patient is a 89-year-old female who presents to the hospital after falling at home.  She was admitted to the trauma service for multiple facial fractures and a subdural hematoma.  She also has a left wrist injury.  She has a history of left wrist injury from over a decade ago.  She would a distal radius fracture that was fixed with a plate and screw construct after which she did well.  She complains of pain localized to the ulnar aspect of the wrist.  It does not radiate.  It is localized over the distal aspect of the ulna.  No numbness or tingling into fingertips.    History reviewed. No pertinent past medical history.    History reviewed. No pertinent surgical history.    Current Facility-Administered Medications   Medication    docusate sodium capsule 100 mg    enoxaparin injection 40 mg    hydrALAZINE injection 10 mg    labetaloL injection 10 mg    mupirocin 2 % ointment    ondansetron injection 4 mg    oxyCODONE immediate release tablet 5 mg    polyethylene glycol packet 17 g    sodium chloride 0.9% flush 10 mL       Review of patient's allergies indicates:   Allergen Reactions    Codeine        History reviewed. No pertinent family history.    Social History     Socioeconomic History    Marital status:      Social Determinants of Health     Financial Resource Strain: Low Risk  (11/13/2023)    Overall Financial Resource Strain (CARDIA)     Difficulty of Paying Living Expenses: Not hard at all   Food Insecurity: No Food Insecurity (11/13/2023)    Hunger Vital Sign     Worried About Running Out of Food in the Last Year: Never true     Ran Out of Food in the Last Year: Never true   Transportation Needs: No Transportation Needs (11/13/2023)    PRAPARE - Transportation     Lack of Transportation (Medical): No     Lack of Transportation (Non-Medical): No   Social Connections:  Unknown (11/13/2023)    Social Connection and Isolation Panel [NHANES]     Frequency of Communication with Friends and Family: More than three times a week     Attends Restoration Services: More than 4 times per year   Housing Stability: Low Risk  (11/13/2023)    Housing Stability Vital Sign     Unable to Pay for Housing in the Last Year: No     Number of Places Lived in the Last Year: 1     Unstable Housing in the Last Year: No       Review of Systems:    Constitution:   Denies chills, fever, and sweats.  HENT:   Denies headaches or blurry vision.  Cardiovascular:  Denies chest pain or irregular heart beat.  Respiratory:   Denies cough or shortness of breath.  Gastrointestinal:  Denies abdominal pain, nausea, or vomiting.  Musculoskeletal:   Denies muscle cramps.  Neurological:   Denies dizziness or focal weakness.  Psychiatric/Behavior: Normal mental status.  Hematology/Lymph:  Denies bleeding problem or easy bruising/bleeding.  Skin:    Denies rash or suspicious lesions.    Examination:    Vital Signs:    Vitals:    11/15/23 0543 11/15/23 0744 11/15/23 1200 11/15/23 1539   BP: (!) 166/76 (!) 156/76 (!) 145/80 (!) 142/84   Pulse: 91 76 91 107   Resp:  18 18 18   Temp: 98 °F (36.7 °C) 98.6 °F (37 °C) 98.6 °F (37 °C) 98.5 °F (36.9 °C)   TempSrc: Oral Oral Oral Oral   SpO2: 95% 95% 95% 95%   Weight:       Height:           Body mass index is 21.26 kg/m².    Constitution:   Well-developed, well nourished patient in no acute distress.  Neurological:   Alert and oriented x 3 and cooperative to examination.     Psychiatric/Behavior: Normal mental status.  Respiratory:   No shortness of breath.  Eyes:    Extraoccular muscles intact  Skin:    No scars, rash or suspicious lesions.    MSK:   Left upper extremity: No open wounds or rashes.  Tenderness to palpation over the distal ulna.  No tenderness to palpation over the distal radius.  Wrist range of motion is 70° of extension, 80° of flexion, full pro supination.  Distal  radioulnar joint is stable.  She is able to make a fist and extend her digits.  Sensation light touch intact in median ulnar radial distribution.  Radial pulses 2+ hand is warm well perfused    Imaging:   X-ray of the left wrist shows previous distal radius fracture treated with plate and screw construct no evidence of failure.  There was arthritic changes.  There is also a nondisplaced ulnar styloid fracture     Assessment: Left nondisplaced distal ulna fracture    Plan:  This can be treated non operatively in a Velcro wrist splint.  She can take the splint off several times a day to work on range of motion.  She can use her left hand for walking with her walker.  She should wear the splint while using her walker.  She is no restrictions from my standpoint point otherwise.  If she is still hurting in 6 weeks, she can follow up with me then    Follow Up:  6 weeks if needed  Xray at next visit:  Left wrist

## 2023-11-16 LAB
ALBUMIN SERPL-MCNC: 2.6 G/DL (ref 3.4–4.8)
ALBUMIN/GLOB SERPL: 0.9 RATIO (ref 1.1–2)
ALP SERPL-CCNC: 69 UNIT/L (ref 40–150)
ALT SERPL-CCNC: 6 UNIT/L (ref 0–55)
AST SERPL-CCNC: 9 UNIT/L (ref 5–34)
BILIRUB SERPL-MCNC: 0.8 MG/DL
BUN SERPL-MCNC: 9.8 MG/DL (ref 9.8–20.1)
CALCIUM SERPL-MCNC: 8.9 MG/DL (ref 8.4–10.2)
CHLORIDE SERPL-SCNC: 103 MMOL/L (ref 98–111)
CO2 SERPL-SCNC: 30 MMOL/L (ref 23–31)
CREAT SERPL-MCNC: 0.7 MG/DL (ref 0.55–1.02)
CRP SERPL-MCNC: 11.5 MG/L
ERYTHROCYTE [DISTWIDTH] IN BLOOD BY AUTOMATED COUNT: 14 % (ref 11.5–17)
GFR SERPLBLD CREATININE-BSD FMLA CKD-EPI: >60 MLS/MIN/1.73/M2
GLOBULIN SER-MCNC: 2.9 GM/DL (ref 2.4–3.5)
GLUCOSE SERPL-MCNC: 101 MG/DL (ref 75–121)
HCT VFR BLD AUTO: 37.9 % (ref 37–47)
HGB BLD-MCNC: 12.6 G/DL (ref 12–16)
MCH RBC QN AUTO: 31.4 PG (ref 27–31)
MCHC RBC AUTO-ENTMCNC: 33.2 G/DL (ref 33–36)
MCV RBC AUTO: 94.5 FL (ref 80–94)
NRBC BLD AUTO-RTO: 0 %
PLATELET # BLD AUTO: 261 X10(3)/MCL (ref 130–400)
PMV BLD AUTO: 11.6 FL (ref 7.4–10.4)
POTASSIUM SERPL-SCNC: 3.7 MMOL/L (ref 3.5–5.1)
PREALB SERPL-MCNC: 14.8 MG/DL (ref 14–37)
PROT SERPL-MCNC: 5.5 GM/DL (ref 5.8–7.6)
RBC # BLD AUTO: 4.01 X10(6)/MCL (ref 4.2–5.4)
SODIUM SERPL-SCNC: 138 MMOL/L (ref 132–146)
WBC # SPEC AUTO: 8.67 X10(3)/MCL (ref 4.5–11.5)

## 2023-11-16 PROCEDURE — 85027 COMPLETE CBC AUTOMATED: CPT

## 2023-11-16 PROCEDURE — 25000003 PHARM REV CODE 250

## 2023-11-16 PROCEDURE — 97116 GAIT TRAINING THERAPY: CPT

## 2023-11-16 PROCEDURE — 63600175 PHARM REV CODE 636 W HCPCS: Performed by: NURSE PRACTITIONER

## 2023-11-16 PROCEDURE — 80053 COMPREHEN METABOLIC PANEL: CPT

## 2023-11-16 PROCEDURE — 84134 ASSAY OF PREALBUMIN: CPT

## 2023-11-16 PROCEDURE — 97530 THERAPEUTIC ACTIVITIES: CPT

## 2023-11-16 PROCEDURE — 97535 SELF CARE MNGMENT TRAINING: CPT | Mod: CO

## 2023-11-16 PROCEDURE — 11000001 HC ACUTE MED/SURG PRIVATE ROOM

## 2023-11-16 PROCEDURE — 86140 C-REACTIVE PROTEIN: CPT

## 2023-11-16 RX ORDER — BISACODYL 10 MG
10 SUPPOSITORY, RECTAL RECTAL ONCE
Status: COMPLETED | OUTPATIENT
Start: 2023-11-16 | End: 2023-11-16

## 2023-11-16 RX ADMIN — DOCUSATE SODIUM 100 MG: 100 CAPSULE, LIQUID FILLED ORAL at 09:11

## 2023-11-16 RX ADMIN — POLYETHYLENE GLYCOL 3350 17 G: 17 POWDER, FOR SOLUTION ORAL at 09:11

## 2023-11-16 RX ADMIN — ENOXAPARIN SODIUM 40 MG: 40 INJECTION SUBCUTANEOUS at 09:11

## 2023-11-16 RX ADMIN — MUPIROCIN: 20 OINTMENT TOPICAL at 09:11

## 2023-11-16 RX ADMIN — BISACODYL 10 MG: 10 SUPPOSITORY RECTAL at 12:11

## 2023-11-16 NOTE — PLAN OF CARE
Problem: Adult Inpatient Plan of Care  Goal: Plan of Care Review  11/16/2023 0738 by Иван Hernandez RN  Outcome: Ongoing, Progressing  11/15/2023 1818 by Иван Hernandez RN  Outcome: Ongoing, Progressing  Goal: Patient-Specific Goal (Individualized)  11/16/2023 0738 by Иван Hernandez RN  Outcome: Ongoing, Progressing  11/15/2023 1818 by Иван Hernandez RN  Outcome: Ongoing, Progressing  Goal: Absence of Hospital-Acquired Illness or Injury  11/16/2023 0738 by Иван Hernandez RN  Outcome: Ongoing, Progressing  11/15/2023 1818 by Иван Hernandez RN  Outcome: Ongoing, Progressing  Goal: Optimal Comfort and Wellbeing  11/16/2023 0738 by Иван Hernandez RN  Outcome: Ongoing, Progressing  11/15/2023 1818 by Иван Hernandez RN  Outcome: Ongoing, Progressing  Goal: Readiness for Transition of Care  11/16/2023 0738 by Иван Hernandez RN  Outcome: Ongoing, Progressing  11/15/2023 1818 by Иван Hernandez RN  Outcome: Ongoing, Progressing     Problem: Impaired Wound Healing  Goal: Optimal Wound Healing  11/16/2023 0738 by Иван Hernandez RN  Outcome: Ongoing, Progressing  11/15/2023 1818 by Иван Hernandez RN  Outcome: Ongoing, Progressing     Problem: Fall Injury Risk  Goal: Absence of Fall and Fall-Related Injury  11/16/2023 0738 by Иван Hernandez RN  Outcome: Ongoing, Progressing  11/15/2023 1818 by Иван Hernandez RN  Outcome: Ongoing, Progressing     Problem: Skin Injury Risk Increased  Goal: Skin Health and Integrity  11/16/2023 0738 by Иван Hernandez RN  Outcome: Ongoing, Progressing  11/15/2023 1818 by Иван Hernandez RN  Outcome: Ongoing, Progressing

## 2023-11-16 NOTE — CONSULTS
"Inpatient Nutrition Evaluation    Admit Date: 11/12/2023   Total duration of encounter: 4 days    Nutrition Recommendation/Prescription     -Continue diet as ordered and tolerated.   -Add vanilla Boost Plus (provides 360 kcal, 14 g protein per serving) BID for additional nutrition.     Nutrition Assessment     Chart Review    Reason Seen: continuous nutrition monitoring    Malnutrition Screening Tool Results   Have you recently lost weight without trying?: No  Have you been eating poorly because of a decreased appetite?: No   MST Score: 0     Diagnosis:  Fracture of the left anterior, posterior, and medial maxillary sinus wall, posterior floor of the orbit, medial maxillary sinus wall and a subtle left frontal subdural hematoma     Relevant Medical History:  no apparent past medical history     Nutrition-Related Medications: docusate sodium, miralax    Nutrition-Related Labs:  11/16/23 Protein total 5.5, Alb 2.6, CRP 11.5    Diet Order: Diet Adult Regular  Oral Supplement Order: none  Appetite/Oral Intake: fair/50-75% of meals  Factors Affecting Nutritional Intake:  eating soft foods s/t facial hematoma  Food/Tenriism/Cultural Preferences: none reported  Food Allergies: none reported    Skin Integrity: bruised (ecchymotic)  Wound(s):       Comments    11/16/23 Pt reports typically good appetite and intake of meals. Usually drinks 1 Equate protein supplement daily at home. Since fall, has been eating soft foods s/t soreness with facial bruising. Willing to trial Boost 2x/day to increase po intake. Wt stable around 115lbs for the last 2 years. Had BM today after getting suppository.     Anthropometrics    Height: 5' 3" (160 cm) Height Method: Measured  Last Weight: 54.4 kg (120 lb) (11/13/23 1211) Weight Method: Bed Scale  BMI (Calculated): 21.3  BMI Classification: underweight (BMI less than 22 if >65 years of age)     Ideal Body Weight (IBW), Female: 115 lb     % Ideal Body Weight, Female (lb): 104.35 %            "         Usual Body Weight (UBW), k.27 kg  % Usual Body Weight: 104.35     Usual Weight Provided By: patient    Wt Readings from Last 5 Encounters:   23 54.4 kg (120 lb)     Weight Change(s) Since Admission:  Admit Weight: 54.4 kg (120 lb) (23 0027)      Patient Education    Not applicable.    Monitoring & Evaluation     Dietitian will monitor food and beverage intake and weight change.  Nutrition Risk/Follow-Up: low (follow-up in 5-7 days)  Patients assigned 'low nutrition risk' status do not qualify for a full nutritional assessment but will be monitored and re-evaluated in a 5-7 day time period. Please consult if re-evaluation needed sooner.

## 2023-11-16 NOTE — PLAN OF CARE
Pt tells me that Riddhi is going to [facility] to do the arrangements/financial today at 11 am.  Elena updates me that we have the 142. She is aware  is ready to dc.   Communication sent to [facility] asking if she can follow up up with [facility] after 11 to see if they have what they need from family and if so will they accept and when as  is ready

## 2023-11-16 NOTE — PT/OT/SLP PROGRESS
Physical Therapy Treatment    Patient Name:  Georgeanna P Lejeune   MRN:  [id number]    Recommendations:     Discharge therapy intensity: Moderate Intensity Therapy   Discharge Equipment Recommendations: walker, rolling  Barriers to discharge: Impaired mobility and Ongoing medical needs    Assessment:     Georgeanna P Lejeune is a 89 y.o. female admitted with a medical diagnosis of Closed fracture of left orbital floor, maxillary sinus fx, L frontal SDH, displaced rib fx, non op L ulnar styloid fx s/p fall.  She presents with the following impairments/functional limitations: weakness, impaired endurance, impaired self care skills, impaired functional mobility, gait instability, impaired balance, decreased upper extremity function. Pt with improving activity tolerance however demonstrates decline in stability with onset of fatigue. Would benefit from ongoing skilled services.    Rehab Prognosis: Good; patient would benefit from acute skilled PT services to address these deficits and reach maximum level of function.    Recent Surgery: * No surgery found *      Plan:     During this hospitalization, patient to be seen 5 x/week to address the identified rehab impairments via gait training, therapeutic activities, therapeutic exercises and progress toward the following goals:    Plan of Care Expires:  12/16/23    Subjective     Chief Complaint: none  Patient/Family Comments/goals: to return to PLOF  Pain/Comfort:  Pain Rating 1: 0/10      Objective:     Communicated with nurse prior to session.  Patient found up in chair with pulse ox (continuous), telemetry, peripheral IV, PureWick upon PT entry to room.     General Precautions: Standard, fall, seizure  Orthopedic Precautions: Can use LUE on walker  Braces:  (L forearm splint)  Respiratory Status: Room air    Functional Mobility:  Transfers:     Verbal cues to correct hand placement for Improved stability  Sit to Stand:  minimum assistance with rolling walker  Toilet  transfer: Min A with RW and grab bar  Gait: 10ft + 80ft + 60ft with RW CGA. Verbal cues to increase step length and for forward gaze. Decline in stability noted with fatigue, Pt with poor awareness of functional deficits and required encouragement to take rest break.    Therapeutic Activities/Exercises:  Supported standing balance  with reaches within MATHEUS.  Education:  Patient, daughter/s were, and friend  provided with verbal education education regarding PT POC, safety.  Understanding was verbalized.     Patient left up in chair with all lines intact, call button in reach, and daughter and friend present..    GOALS:   Multidisciplinary Problems       Physical Therapy Goals          Problem: Physical Therapy    Goal Priority Disciplines Outcome Goal Variances Interventions   Physical Therapy Goal     PT, PT/OT Ongoing, Progressing     Description: Goals to be met by: d/c     Patient will increase functional independence with mobility by performin. Supine to sit with Stand-by Assistance  2. Sit to supine with Stand-by Assistance  3. Sit to stand transfer with Stand-by Assistance  4. Bed to chair transfer with Stand-by Assistance using Rolling Walker  5. Gait  x 150 feet with Stand-by Assistance using Rolling Walker.                          Time Tracking:     PT Received On: 23  PT Start Time: 1100     PT Stop Time: 1124  PT Total Time (min): 24 min     Billable Minutes: Gait Training 10min and Therapeutic Activity 14min    Treatment Type: Treatment  PT/PTA: PT     Number of PTA visits since last PT visit: 3     2023

## 2023-11-16 NOTE — PROGRESS NOTES
"   Trauma Surgery   Progress Note  Admit Date: 11/12/2023  HD#4  POD#* No surgery found *    Subjective:   Interval history:  ANJALION  ASA  Worked with therapy  Case management following for placement  AM labs pending     Home Meds: No current outpatient medications   Scheduled Meds:   docusate sodium  100 mg Oral BID    enoxparin  40 mg Subcutaneous Q12H (prophylaxis, 0900/2100)    mupirocin   Nasal BID    polyethylene glycol  17 g Oral BID     Continuous Infusions:  PRN Meds:hydrALAZINE, labetaloL, ondansetron, oxyCODONE, sodium chloride 0.9%     Objective:     VITAL SIGNS: 24 HR MIN & MAX LAST   Temp  Min: 97.7 °F (36.5 °C)  Max: 98.6 °F (37 °C)  97.7 °F (36.5 °C)   BP  Min: 142/84  Max: 192/92  (!) 157/67    Pulse  Min: 60  Max: 107  94    Resp  Min: 18  Max: 18  18    SpO2  Min: 94 %  Max: 97 %  (!) 94 %      HT: 5' 3" (160 cm)  WT: 54.4 kg (120 lb)  BMI: 21.3     Intake/output:  Intake/Output - Last 3 Shifts         11/14 0700  11/15 0659 11/15 0700 11/16 0659    Urine (mL/kg/hr) 200 (0.2) 300 (0.2)    Total Output 200 300    Net -200 -300                  Intake/Output Summary (Last 24 hours) at 11/16/2023 0537  Last data filed at 11/16/2023 0524  Gross per 24 hour   Intake --   Output 500 ml   Net -500 ml           Lines/drains/airway:       Peripheral IV - Single Lumen 11/11/23 20 G Left;Posterior Forearm (Active)   Site Assessment Clean;Dry;Intact 11/13/23 0800   Extremity Assessment Distal to IV No abnormal discoloration 11/13/23 0800   Line Status Saline locked 11/13/23 0800   Dressing Status Clean;Dry;Intact 11/13/23 0800   Dressing Intervention Integrity maintained 11/13/23 0800   Number of days: 2            Peripheral IV - Single Lumen 11/12/23 0325 20 G Anterior;Proximal;Right Forearm (Active)   Site Assessment Clean;No redness;Dry;Intact;No swelling 11/13/23 0800   Extremity Assessment Distal to IV No abnormal discoloration 11/13/23 0800   Line Status Saline locked 11/13/23 0800   Dressing Status " "Clean;Dry;Intact 11/13/23 0800   Dressing Intervention Integrity maintained 11/13/23 0800   Number of days: 1       Female External Urinary Catheter 11/12/23 0325 (Active)   Skin no redness;no breakdown 11/13/23 0800   Tolerance no signs/symptoms of discomfort 11/13/23 0800   Suction Continuous suction at 70 mmHg 11/13/23 0800   Date of last wick change 11/12/23 11/13/23 0000   Time of last wick change 2235 11/13/23 0000   Output (mL) 200 mL 11/13/23 0557   Number of days: 1       Physical examination:  Gen: NAD, AAOx3, answering questions appropriately  HEENT: left periorbital ecchymosis   CV: RR  Resp: NWOB  Abd: S/NT/ND  Msk: moving all extremities spontaneously and purposefully  Neuro: CN II-XII grossly intact  Skin/wounds: warm dry     Labs:  Renal:  Recent Labs     11/14/23  0605   BUN 11.8   CREATININE 0.75       No results for input(s): "LACTIC" in the last 72 hours.  FEN/GI:  Recent Labs     11/14/23  0605      K 3.7   CO2 27   CALCIUM 9.1   ALBUMIN 2.7*   BILITOT 1.0   AST 10   ALKPHOS 67   ALT 7       Heme:  Recent Labs     11/14/23  0605   HGB 12.6   HCT 38.6          ID:  Recent Labs     11/14/23  0605   WBC 9.12       CBG:  Recent Labs     11/14/23  0605   GLUCOSE 98        No results for input(s): "POCTGLUCOSE" in the last 72 hours.   Cardiovascular:  Recent Labs   Lab 11/13/23  0150   TROPONINI 0.025       I have reviewed all pertinent lab results within the past 24 hours.    Imaging:  CT Head Without Contrast   Final Result      1. 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 Again noted is subtle subarachnoid hemorrhage in the left frontoparietal convexity sulci (series 2, images 37 and 27), slightly reduced since the prior examination. No intraventricular hemorrhage is seen.      2. Again noted is a 7 " mm focal calcification in the left anterior centrum semiovale without perilesional edema      I concur with the preliminary report above.         Electronically signed by: Chino Santana   Date:    11/14/2023   Time:    11:06      X-Ray Wrist 2 View Left   Final Result      Acute appearing nondisplaced fracture of the ulnar styloid      Old postsurgical changes seen in the radius      Diffuse soft tissue swelling in the wrist         Electronically signed by: Chino Santana   Date:    11/13/2023   Time:    17:30      CT Head Without Contrast   Final Result      Stable areas of subdural and subarachnoid hemorrhage as outlined above stable since prior examination      Pansinusitis         Electronically signed by: Chino Santana   Date:    11/12/2023   Time:    08:38      CT Head Without Contrast   Final Result      Findings seen consistent with subdural hemorrhage in the left parietal and occipital region as well as subarachnoid hemorrhage in the left parietal region at the centrum semiovale and small amount of blood the along the  inferior aspect of the left sylvian fissure.  Findings are unchanged since the prior examination.         Electronically signed by: Chino Santana   Date:    11/12/2023   Time:    08:41      Xray Previous   Final Result      CT Previous   Final Result      CT Previous   Final Result      CT Previous   Final Result         I have reviewed all pertinent imaging results/findings within the past 24 hours.    Micro/Path/Other:  Microbiology Results (last 7 days)       ** No results found for the last 168 hours. **           Specimen (168h ago, onward)      None             Problems list:  Active Problem List with Overview Notes    Diagnosis Date Noted    Traumatic closed fracture of ulnar styloid with minimal displacement, left, initial encounter 11/15/2023    Traumatic subdural hematoma without loss of consciousness 11/12/2023    Closed fracture of left orbital floor 11/12/2023     Closed fracture of left side of maxilla with routine healing 11/12/2023        Assessment & Plan:   89-year-old female with no apparent past medical history reportedly takes no medications who fell at home twice earlier today.  She does not take any blood thinners.  CT scans revealed comminuted fracture of the left anterior, posterior, and medial maxillary sinus wall, posterior floor of the orbit, medial maxillary sinus wall and a subtle left frontal subdural hematoma.   Consults:   Neurosurgery  Ophthalmology   Plastic Surgery  Orthopedic surgery   Therapy:  Physical Therapy  Occupational Therapy Weight bearing status:   RUE: WBAT  LUE: WBAT  RLE: WBAT  LLE: WBAT Precautions:  Seizure and Standard   Seizure prophylaxis:  Keppra discontinued by NSGY VTE prophylaxis:     Lovenox  GI prophylaxis:  Not indicated. Tolerating ordered diet.   Outpatient follow up:  PCP  Orthopedic surgery  Neurosurgery   Opthalmology  Disposition:  SNF     - Regular diet  - MTh labs  - pain control PRN  - IS  - Therapy as above  - VTE prevention as above   - CM - SNF, medically stable       11/16/2023 5:46 AM     The above findings, diagnostics, and treatment plan were discussed with Dr. Tevin Lee who will follow with further assessments and recommendations. Please call with any questions, concerns, or clinical status changes.  This note/OR report was created with the assistance of  voice recognition software or phone  dictation.  There may be transcription errors as a result of using this technology however minimal. Effort has been made to assure accuracy of transcription but any obvious errors or omissions should be clarified with the author of the document.

## 2023-11-16 NOTE — PT/OT/SLP PROGRESS
Occupational Therapy   Treatment    Name: Georgeanna P Lejeune  MRN: [id number]  Admitting Diagnosis:  Closed fracture of left orbital floor       Recommendations:     Recommended therapy intensity at discharge: Moderate Intensity Therapy   Discharge Equipment Recommendations:  to be determined by next level of care  Barriers to discharge:       Assessment:     Georgeanna P Lejeune is a 89 y.o. female with a medical diagnosis of Closed fracture of left orbital floor.  She presents with HOB elevated, pleasant and cooperative, Min A for mobility during session with RW. Pt. Progressing well, continue POC. Performance deficits affecting function are weakness, impaired endurance, impaired self care skills, impaired functional mobility, impaired balance.     Rehab Prognosis:  Good; patient would benefit from acute skilled OT services to address these deficits and reach maximum level of function.       Plan:     Patient to be seen 3 x/week to address the above listed problems via self-care/home management, therapeutic activities, therapeutic exercises  Plan of Care Expires: 12/11/23  Plan of Care Reviewed with: patient    Subjective     Pain/Comfort:       Objective:     Communicated with: RN prior to session.  Patient found HOB elevated with   upon OT entry to room.    General Precautions: Standard, fall    Orthopedic Precautions:N/A  Braces: N/A  Respiratory Status: Room air       Occupational Performance:   (Bed Mobility- Min A)  (Sitting balance-SBA)  (Sit to stand- Min A)  Pt. Ambulating from EOB to bathroom using RW for UE support with balance, no LOB noted Min A for balance.   Pt. Performing toilet t/f with Min A for safe descend onto low surface, SBA for toilet hygiene.  Pt. Standing at sink while performing grooming task (brushing teeth) with appropriate setup and preparation.   Pt. Left up in chair with all needs within reach.     Therapeutic Positioning    OT interventions performed during the course of today's  session in an effort to prevent and/or reduce acquired pressure injuries:   Therapeutic positioning was provided at the conclusion of session to offload all bony prominences for the prevention and/or reduction of pressure injuries      [facility] 6 Click ADL:      Patient Education:  Patient provided with verbal education education regarding fall prevention and pressure ulcer prevention.  Understanding was verbalized.      Patient left up in chair with all lines intact and call button in reach    GOALS:   Multidisciplinary Problems       Occupational Therapy Goals          Problem: Occupational Therapy    Goal Priority Disciplines Outcome Interventions   Occupational Therapy Goal     OT, PT/OT Ongoing, Progressing    Description: LTG: Pt will perform basic ADLs and ADL transfers with Modified independence using LRAD by discharge.    STG: to be met by 12/13/23    Pt will complete grooming standing at sink with LRAD with SBA.  Pt will complete UB dressing with SBA.  Pt will complete LB dressing with SBA using LRAD.  Pt will complete toileting with SBA using LRAD.  Pt will complete functional mobility to/from toilet and toilet transfer with SBA using LRAD.                        Time Tracking:     OT Date of Treatment: 11/16/23  OT Start Time: 0934  OT Stop Time: 0957  OT Total Time (min): 23 min    Billable Minutes:Self Care/Home Management 2    OT/SARAH: SARAH     Number of SARAH visits since last OT visit: 1    11/16/2023

## 2023-11-16 NOTE — PLAN OF CARE
Pt accepted to [facility] but needs to have BM. This being addressed. SHANTANU Patricia will be following

## 2023-11-16 NOTE — PLAN OF CARE
[facility] sent update with Pasrr/142 and CXR via carport . Spoke to Ramandeep pt will be ready for discharge on tomorrow, GERRI hassan will

## 2023-11-17 VITALS
HEIGHT: 63 IN | TEMPERATURE: 99 F | HEART RATE: 96 BPM | BODY MASS INDEX: 21.26 KG/M2 | WEIGHT: 120 LBS | DIASTOLIC BLOOD PRESSURE: 76 MMHG | RESPIRATION RATE: 18 BRPM | SYSTOLIC BLOOD PRESSURE: 166 MMHG | OXYGEN SATURATION: 95 %

## 2023-11-17 PROCEDURE — 63600175 PHARM REV CODE 636 W HCPCS: Performed by: NURSE PRACTITIONER

## 2023-11-17 PROCEDURE — 99238 PR HOSPITAL DISCHARGE DAY,<30 MIN: ICD-10-PCS | Mod: ,,, | Performed by: SURGERY

## 2023-11-17 PROCEDURE — 99238 HOSP IP/OBS DSCHRG MGMT 30/<: CPT | Mod: ,,, | Performed by: SURGERY

## 2023-11-17 PROCEDURE — 25000003 PHARM REV CODE 250: Performed by: SURGERY

## 2023-11-17 PROCEDURE — 25000003 PHARM REV CODE 250

## 2023-11-17 RX ORDER — ACETAMINOPHEN 325 MG/1
650 TABLET ORAL EVERY 6 HOURS PRN
Status: DISCONTINUED | OUTPATIENT
Start: 2023-11-17 | End: 2023-11-17 | Stop reason: HOSPADM

## 2023-11-17 RX ADMIN — DOCUSATE SODIUM 100 MG: 100 CAPSULE, LIQUID FILLED ORAL at 09:11

## 2023-11-17 RX ADMIN — ENOXAPARIN SODIUM 40 MG: 40 INJECTION SUBCUTANEOUS at 09:11

## 2023-11-17 RX ADMIN — MUPIROCIN: 20 OINTMENT TOPICAL at 09:11

## 2023-11-17 RX ADMIN — ACETAMINOPHEN 650 MG: 325 TABLET, FILM COATED ORAL at 09:11

## 2023-11-17 RX ADMIN — POLYETHYLENE GLYCOL 3350 17 G: 17 POWDER, FOR SOLUTION ORAL at 09:11

## 2023-11-17 NOTE — PLAN OF CARE
11/17/23 1343   Final Note   Assessment Type Final Discharge Note   Anticipated Discharge Disposition SNF   Post-Acute Status   Post-Acute Authorization Placement   Post-Acute Placement Status Set-up Complete/Auth obtained   Coverage [place]   Discharge Delays None known at this time

## 2023-11-17 NOTE — HOSPITAL COURSE
Georgeanna P Lejeune is a 89 year old female admitted for SDH, facial fractures, and L wrist fracture. She was evaluated by Neurosurgery, Ophthalmology, plastics, Orthopedic surgery.  All of her injuries were nonoperative.  Tolerating diet.  Pain controlled.  Worked with therapy.  Stable for discharge to skilled nursing facility.

## 2023-11-17 NOTE — PROGRESS NOTES
"   Trauma Surgery   Progress Note  Admit Date: 11/12/2023  HD#5  POD#* No surgery found *    Subjective:   Interval history:  IDALIA BRAY  + BM 11/16  Worked with therapy  Case management following for placement  Lab holiday    Home Meds: No current outpatient medications   Scheduled Meds:   docusate sodium  100 mg Oral BID    enoxparin  40 mg Subcutaneous Q12H (prophylaxis, 0900/2100)    mupirocin   Nasal BID    polyethylene glycol  17 g Oral BID     Continuous Infusions:  PRN Meds:hydrALAZINE, labetaloL, ondansetron, oxyCODONE, sodium chloride 0.9%     Objective:     VITAL SIGNS: 24 HR MIN & MAX LAST   Temp  Min: 98.1 °F (36.7 °C)  Max: 98.7 °F (37.1 °C)  98.6 °F (37 °C)   BP  Min: 145/84  Max: 155/91  (!) 152/78    Pulse  Min: 66  Max: 97  89    Resp  Min: 19  Max: 19  19    SpO2  Min: 94 %  Max: 96 %  (!) 94 %      HT: 5' 3" (160 cm)  WT: 54.4 kg (120 lb)  BMI: 21.3     Intake/output:  Intake/Output - Last 3 Shifts         11/15 0700 11/16 0659 11/16 0700 11/17 0659    Urine (mL/kg/hr) 300 (0.2)     Total Output 300     Net -300                 No intake or output data in the 24 hours ending 11/17/23 0547        Lines/drains/airway:       Peripheral IV - Single Lumen 11/11/23 20 G Left;Posterior Forearm (Active)   Site Assessment Clean;Dry;Intact 11/13/23 0800   Extremity Assessment Distal to IV No abnormal discoloration 11/13/23 0800   Line Status Saline locked 11/13/23 0800   Dressing Status Clean;Dry;Intact 11/13/23 0800   Dressing Intervention Integrity maintained 11/13/23 0800   Number of days: 2            Peripheral IV - Single Lumen 11/12/23 0325 20 G Anterior;Proximal;Right Forearm (Active)   Site Assessment Clean;No redness;Dry;Intact;No swelling 11/13/23 0800   Extremity Assessment Distal to IV No abnormal discoloration 11/13/23 0800   Line Status Saline locked 11/13/23 0800   Dressing Status Clean;Dry;Intact 11/13/23 0800   Dressing Intervention Integrity maintained 11/13/23 0800   Number of days: " "1       Female External Urinary Catheter 11/12/23 0325 (Active)   Skin no redness;no breakdown 11/13/23 0800   Tolerance no signs/symptoms of discomfort 11/13/23 0800   Suction Continuous suction at 70 mmHg 11/13/23 0800   Date of last wick change 11/12/23 11/13/23 0000   Time of last wick change 2235 11/13/23 0000   Output (mL) 200 mL 11/13/23 0557   Number of days: 1       Physical examination:  Gen: NAD, AAOx3, answering questions appropriately  HEENT: left periorbital ecchymosis   CV: RR  Resp: NWOB  Abd: S/NT/ND  Msk: moving all extremities spontaneously and purposefully  Neuro: CN II-XII grossly intact  Skin/wounds: warm dry     Labs:  Renal:  Recent Labs     11/14/23  0605 11/16/23  0508   BUN 11.8 9.8   CREATININE 0.75 0.70       No results for input(s): "LACTIC" in the last 72 hours.  FEN/GI:  Recent Labs     11/14/23  0605 11/16/23  0508    138   K 3.7 3.7   CO2 27 30   CALCIUM 9.1 8.9   ALBUMIN 2.7* 2.6*   BILITOT 1.0 0.8   AST 10 9   ALKPHOS 67 69   ALT 7 6       Heme:  Recent Labs     11/14/23  0605 11/16/23  0508   HGB 12.6 12.6   HCT 38.6 37.9    261       ID:  Recent Labs     11/14/23  0605 11/16/23  0508   WBC 9.12 8.67       CBG:  Recent Labs     11/14/23  0605 11/16/23  0508   GLUCOSE 98 101        No results for input(s): "POCTGLUCOSE" in the last 72 hours.   Cardiovascular:  Recent Labs   Lab 11/13/23  0150   TROPONINI 0.025       I have reviewed all pertinent lab results within the past 24 hours.    Imaging:  CT Head Without Contrast   Final Result      1. 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 Again noted is subtle subarachnoid hemorrhage in the left frontoparietal convexity sulci (series 2, images 37 and 27), slightly reduced since the prior examination. No intraventricular hemorrhage is " seen.      2. Again noted is a 7 mm focal calcification in the left anterior centrum semiovale without perilesional edema      I concur with the preliminary report above.         Electronically signed by: Chino Santana   Date:    11/14/2023   Time:    11:06      X-Ray Wrist 2 View Left   Final Result      Acute appearing nondisplaced fracture of the ulnar styloid      Old postsurgical changes seen in the radius      Diffuse soft tissue swelling in the wrist         Electronically signed by: Chino Santana   Date:    11/13/2023   Time:    17:30      CT Head Without Contrast   Final Result      Stable areas of subdural and subarachnoid hemorrhage as outlined above stable since prior examination      Pansinusitis         Electronically signed by: Chino Santana   Date:    11/12/2023   Time:    08:38      CT Head Without Contrast   Final Result      Findings seen consistent with subdural hemorrhage in the left parietal and occipital region as well as subarachnoid hemorrhage in the left parietal region at the centrum semiovale and small amount of blood the along the  inferior aspect of the left sylvian fissure.  Findings are unchanged since the prior examination.         Electronically signed by: Chino Santana   Date:    11/12/2023   Time:    08:41      Xray Previous   Final Result      CT Previous   Final Result      CT Previous   Final Result      CT Previous   Final Result         I have reviewed all pertinent imaging results/findings within the past 24 hours.    Micro/Path/Other:  Microbiology Results (last 7 days)       ** No results found for the last 168 hours. **           Specimen (168h ago, onward)      None             Problems list:  Active Problem List with Overview Notes    Diagnosis Date Noted    Traumatic closed fracture of ulnar styloid with minimal displacement, left, initial encounter 11/15/2023    Traumatic subdural hematoma without loss of consciousness 11/12/2023    Closed fracture  of left orbital floor 11/12/2023    Closed fracture of left side of maxilla with routine healing 11/12/2023        Assessment & Plan:   89-year-old female with no apparent past medical history reportedly takes no medications who fell at home twice earlier today.  She does not take any blood thinners.  CT scans revealed comminuted fracture of the left anterior, posterior, and medial maxillary sinus wall, posterior floor of the orbit, medial maxillary sinus wall and a subtle left frontal subdural hematoma.   Consults:   Neurosurgery  Ophthalmology   Plastic Surgery  Orthopedic surgery   Therapy:  Physical Therapy  Occupational Therapy Weight bearing status:   RUE: WBAT  LUE: WBAT  RLE: WBAT  LLE: WBAT Precautions:  Seizure and Standard   Seizure prophylaxis:  Keppra discontinued by NSGY VTE prophylaxis:     Lovenox  GI prophylaxis:  Not indicated. Tolerating ordered diet.   Outpatient follow up:  PCP  Orthopedic surgery  Neurosurgery   Opthalmology  Disposition:  SNF     - Regular diet  - [facility] labs  - pain control PRN  - IS  - Therapy as above  - VTE prevention as above   - CM - SNF, medically stable       11/17/2023 5:46 AM     The above findings, diagnostics, and treatment plan were discussed with Dr. Vishnu Prasad who will follow with further assessments and recommendations. Please call with any questions, concerns, or clinical status changes.  This note/OR report was created with the assistance of  voice recognition software or phone  dictation.  There may be transcription errors as a result of using this technology however minimal. Effort has been made to assure accuracy of transcription but any obvious errors or omissions should be clarified with the author of the document.

## 2023-11-17 NOTE — DISCHARGE SUMMARY
[facility] - Neurology  General Surgery  Discharge Summary      Patient Name: Georgeanna P Lejeune  MRN: [id number]  Admission Date: 11/12/2023  Hospital Length of Stay: 5 days  Discharge Date and Time:  11/17/2023 10:04 AM  Attending Physician: Mdaina Lee MD   Discharging Provider: Dee Cavanaugh PA-C  Primary Care Provider: Suzette Kuo NP    HPI:   No notes on file    * No surgery found *      Indwelling Lines/Drains at time of discharge:   Lines/Drains/Airways       Drain  Duration             Female External Urinary Catheter 11/16/23 2000 <1 day                  Hospital Course: Georgeanna P Lejeune is a 89 year old female admitted for SDH, facial fractures, and L wrist fracture. She was evaluated by Neurosurgery, Ophthalmology, plastics, Orthopedic surgery.  All of her injuries were nonoperative.  Tolerating diet.  Pain controlled.  Worked with therapy.  Stable for discharge to skilled nursing facility.    Goals of Care Treatment Preferences:  Code Status: Full Code      Consults:   Consults (From admission, onward)          Status Ordering Provider     Inpatient consult to Social Work/Case Management  Once        Provider:  (Not yet assigned)    Acknowledged DEE CAVANAUGH     Inpatient consult to Registered Dietitian/Nutritionist  Once        Provider:  (Not yet assigned)    Completed DEE CAVANAUGH     Inpatient consult to Social Work/Case Management  Once        Provider:  (Not yet assigned)    Acknowledged MADINA LEE     Inpatient consult to Orthopedic Surgery  Once        Provider:  Donte Kovacs MD    Completed BERNABE GOODWIN     Inpatient consult to Ophthalmology  Once        Provider:  Madina Ramires MD    Completed MADINA LEE     Inpatient consult to Social Work/Case Management  Once        Provider:  (Not yet assigned)    Acknowledged POOJA MENDOZA     Inpatient consult to Neurosurgery  Once        Provider:  Pooja Mendoza MD    Completed HÉCTOR  ZAK MCCANN     Inpatient consult to Plastic Surgery  Once        Provider:  Cesar Alexander MD Acknowledged MIKHAIL, ALEXANDER RAOUF            Significant Diagnostic Studies: N/A    Pending Diagnostic Studies:       None          Final Active Diagnoses:    Diagnosis Date Noted POA    PRINCIPAL PROBLEM:  Closed fracture of left orbital floor [S02.32XA] 11/12/2023 Yes    Traumatic closed fracture of ulnar styloid with minimal displacement, left, initial encounter [S52.612A] 11/15/2023 Yes    Traumatic subdural hematoma without loss of consciousness [S06.5X0A] 11/12/2023 Yes    Closed fracture of left side of maxilla with routine healing [S02.40DD] 11/12/2023 Not Applicable      Problems Resolved During this Admission:      Discharged Condition: good    Disposition: Skilled Nursing Facility    Follow Up:    Patient Instructions:   No discharge procedures on file.  Medications:  Reconciled Home Medications:      Medication List      You have not been prescribed any medications.       Marjorie Cavanaugh PA-C  General Surgery  [facility] - Neurology

## 2023-12-07 ENCOUNTER — HOSPITAL ENCOUNTER (OUTPATIENT)
Dept: RADIOLOGY | Facility: HOSPITAL | Age: 88
Discharge: HOME | End: 2023-12-07
Attending: NURSE PRACTITIONER
Payer: COMMERCIAL

## 2023-12-07 DIAGNOSIS — M25.532 LEFT WRIST PAIN: ICD-10-CM

## 2023-12-07 PROCEDURE — 73110 X-RAY EXAM OF WRIST: CPT | Mod: TC,LT
